# Patient Record
Sex: FEMALE | Race: WHITE | NOT HISPANIC OR LATINO | Employment: OTHER | URBAN - METROPOLITAN AREA
[De-identification: names, ages, dates, MRNs, and addresses within clinical notes are randomized per-mention and may not be internally consistent; named-entity substitution may affect disease eponyms.]

---

## 2023-04-25 ENCOUNTER — HOSPITAL ENCOUNTER (INPATIENT)
Facility: HOSPITAL | Age: 68
LOS: 2 days | Discharge: HOME | DRG: 281 | End: 2023-04-27
Attending: STUDENT IN AN ORGANIZED HEALTH CARE EDUCATION/TRAINING PROGRAM | Admitting: INTERNAL MEDICINE
Payer: MEDICARE

## 2023-04-25 DIAGNOSIS — R07.9 CHEST PAIN: Primary | ICD-10-CM

## 2023-04-25 DIAGNOSIS — R11.0 NAUSEA: ICD-10-CM

## 2023-04-25 DIAGNOSIS — I21.3 ST ELEVATION MYOCARDIAL INFARCTION (STEMI), UNSPECIFIED ARTERY: ICD-10-CM

## 2023-04-25 DIAGNOSIS — R07.9 CHEST PAIN, UNSPECIFIED TYPE: ICD-10-CM

## 2023-04-25 DIAGNOSIS — I21.3 STEMI (ST ELEVATION MYOCARDIAL INFARCTION): ICD-10-CM

## 2023-04-25 LAB
ALBUMIN SERPL-MCNC: 4.2 G/DL (ref 3.4–4.8)
ALBUMIN/GLOB SERPL: 1.3 RATIO (ref 1.1–2)
ALP SERPL-CCNC: 83 UNIT/L (ref 40–150)
ALT SERPL-CCNC: 9 UNIT/L (ref 0–55)
APTT PPP: >200 SECONDS (ref 23.2–33.7)
AST SERPL-CCNC: 17 UNIT/L (ref 5–34)
AV INDEX (PROSTH): 0.67
AV MEAN GRADIENT: 2 MMHG
AV PEAK GRADIENT: 4 MMHG
AV VALVE AREA: 2.09 CM2
AV VELOCITY RATIO: 0.76
BASOPHILS # BLD AUTO: 0.03 X10(3)/MCL (ref 0–0.2)
BASOPHILS # BLD AUTO: 0.04 X10(3)/MCL (ref 0–0.2)
BASOPHILS NFR BLD AUTO: 0.2 %
BASOPHILS NFR BLD AUTO: 0.3 %
BILIRUBIN DIRECT+TOT PNL SERPL-MCNC: 0.8 MG/DL
BNP BLD-MCNC: 66 PG/ML
BSA FOR ECHO PROCEDURE: 1.94 M2
BUN SERPL-MCNC: 13.4 MG/DL (ref 9.8–20.1)
CALCIUM SERPL-MCNC: 9.8 MG/DL (ref 8.4–10.2)
CHLORIDE SERPL-SCNC: 100 MMOL/L (ref 98–107)
CHOLEST SERPL-MCNC: 203 MG/DL
CHOLEST/HDLC SERPL: 3 {RATIO} (ref 0–5)
CO2 SERPL-SCNC: 20 MMOL/L (ref 23–31)
CREAT SERPL-MCNC: 1.07 MG/DL (ref 0.55–1.02)
CV ECHO LV RWT: 0.34 CM
DOP CALC AO PEAK VEL: 0.96 M/S
DOP CALC AO VTI: 21.9 CM
DOP CALC LVOT AREA: 3.1 CM2
DOP CALC LVOT DIAMETER: 2 CM
DOP CALC LVOT PEAK VEL: 0.73 M/S
DOP CALC LVOT STROKE VOLUME: 45.84 CM3
DOP CALC MV VTI: 14.7 CM
DOP CALCLVOT PEAK VEL VTI: 14.6 CM
E WAVE DECELERATION TIME: 137 MSEC
E/A RATIO: 1.76
E/E' RATIO: 6.44 M/S
ECHO LV POSTERIOR WALL: 0.88 CM (ref 0.6–1.1)
EJECTION FRACTION: 35 %
EOSINOPHIL # BLD AUTO: 0.01 X10(3)/MCL (ref 0–0.9)
EOSINOPHIL # BLD AUTO: 0.03 X10(3)/MCL (ref 0–0.9)
EOSINOPHIL NFR BLD AUTO: 0.1 %
EOSINOPHIL NFR BLD AUTO: 0.2 %
ERYTHROCYTE [DISTWIDTH] IN BLOOD BY AUTOMATED COUNT: 14.4 % (ref 11.5–17)
ERYTHROCYTE [DISTWIDTH] IN BLOOD BY AUTOMATED COUNT: 14.5 % (ref 11.5–17)
EST. AVERAGE GLUCOSE BLD GHB EST-MCNC: 122.6 MG/DL
FRACTIONAL SHORTENING: 24 % (ref 28–44)
GFR SERPLBLD CREATININE-BSD FMLA CKD-EPI: 58 MLS/MIN/1.73/M2
GLOBULIN SER-MCNC: 3.3 GM/DL (ref 2.4–3.5)
GLUCOSE SERPL-MCNC: 192 MG/DL (ref 82–115)
HBA1C MFR BLD: 5.9 %
HCT VFR BLD AUTO: 38 % (ref 37–47)
HCT VFR BLD AUTO: 40.5 % (ref 37–47)
HDLC SERPL-MCNC: 62 MG/DL (ref 35–60)
HGB BLD-MCNC: 13.3 G/DL (ref 12–16)
HGB BLD-MCNC: 13.9 G/DL (ref 12–16)
IMM GRANULOCYTES # BLD AUTO: 0.05 X10(3)/MCL (ref 0–0.04)
IMM GRANULOCYTES # BLD AUTO: 0.08 X10(3)/MCL (ref 0–0.04)
IMM GRANULOCYTES NFR BLD AUTO: 0.4 %
IMM GRANULOCYTES NFR BLD AUTO: 0.5 %
INR BLD: 1.2 (ref 0–1.3)
INTERVENTRICULAR SEPTUM: 0.82 CM (ref 0.6–1.1)
LDLC SERPL CALC-MCNC: 111 MG/DL (ref 50–140)
LEFT ATRIUM SIZE: 4.1 CM
LEFT INTERNAL DIMENSION IN SYSTOLE: 3.95 CM (ref 2.1–4)
LEFT VENTRICLE DIASTOLIC VOLUME INDEX: 67.36 ML/M2
LEFT VENTRICLE DIASTOLIC VOLUME: 130 ML
LEFT VENTRICLE MASS INDEX: 81 G/M2
LEFT VENTRICLE SYSTOLIC VOLUME INDEX: 35.2 ML/M2
LEFT VENTRICLE SYSTOLIC VOLUME: 67.9 ML
LEFT VENTRICULAR INTERNAL DIMENSION IN DIASTOLE: 5.2 CM (ref 3.5–6)
LEFT VENTRICULAR MASS: 156.93 G
LV LATERAL E/E' RATIO: 5.8 M/S
LV SEPTAL E/E' RATIO: 7.25 M/S
LVOT MG: 1 MMHG
LVOT MV: 0.47 CM/S
LYMPHOCYTES # BLD AUTO: 1.15 X10(3)/MCL (ref 0.6–4.6)
LYMPHOCYTES # BLD AUTO: 2.69 X10(3)/MCL (ref 0.6–4.6)
LYMPHOCYTES NFR BLD AUTO: 20 %
LYMPHOCYTES NFR BLD AUTO: 7.4 %
MCH RBC QN AUTO: 35.2 PG (ref 27–31)
MCH RBC QN AUTO: 35.6 PG (ref 27–31)
MCHC RBC AUTO-ENTMCNC: 34.3 G/DL (ref 33–36)
MCHC RBC AUTO-ENTMCNC: 35 G/DL (ref 33–36)
MCV RBC AUTO: 101.6 FL (ref 80–94)
MCV RBC AUTO: 102.5 FL (ref 80–94)
MONOCYTES # BLD AUTO: 0.75 X10(3)/MCL (ref 0.1–1.3)
MONOCYTES # BLD AUTO: 0.9 X10(3)/MCL (ref 0.1–1.3)
MONOCYTES NFR BLD AUTO: 5.6 %
MONOCYTES NFR BLD AUTO: 5.8 %
MV MEAN GRADIENT: 1 MMHG
MV PEAK A VEL: 0.33 M/S
MV PEAK E VEL: 0.58 M/S
MV PEAK GRADIENT: 1 MMHG
MV STENOSIS PRESSURE HALF TIME: 82 MS
MV VALVE AREA BY CONTINUITY EQUATION: 3.12 CM2
MV VALVE AREA P 1/2 METHOD: 2.68 CM2
NEUTROPHILS # BLD AUTO: 13.45 X10(3)/MCL (ref 2.1–9.2)
NEUTROPHILS # BLD AUTO: 9.9 X10(3)/MCL (ref 2.1–9.2)
NEUTROPHILS NFR BLD AUTO: 73.5 %
NEUTROPHILS NFR BLD AUTO: 86 %
NRBC BLD AUTO-RTO: 0 %
NRBC BLD AUTO-RTO: 0 %
OHS LV EJECTION FRACTION SIMPSONS BIPLANE MOD: 3 %
PISA TR MAX VEL: 3.16 M/S
PLATELET # BLD AUTO: 216 X10(3)/MCL (ref 130–400)
PLATELET # BLD AUTO: 289 X10(3)/MCL (ref 130–400)
PMV BLD AUTO: 11.8 FL (ref 7.4–10.4)
PMV BLD AUTO: 12 FL (ref 7.4–10.4)
POTASSIUM SERPL-SCNC: 3.8 MMOL/L (ref 3.5–5.1)
PROT SERPL-MCNC: 7.5 GM/DL (ref 5.8–7.6)
PROTHROMBIN TIME: 15.1 SECONDS (ref 12.5–14.5)
PV PEAK VELOCITY: 0.75 CM/S
RA PRESSURE: 8 MMHG
RBC # BLD AUTO: 3.74 X10(6)/MCL (ref 4.2–5.4)
RBC # BLD AUTO: 3.95 X10(6)/MCL (ref 4.2–5.4)
SODIUM SERPL-SCNC: 138 MMOL/L (ref 136–145)
TDI LATERAL: 0.1 M/S
TDI SEPTAL: 0.08 M/S
TDI: 0.09 M/S
TR MAX PG: 40 MMHG
TRICUSPID ANNULAR PLANE SYSTOLIC EXCURSION: 2.2 CM
TRIGL SERPL-MCNC: 148 MG/DL (ref 37–140)
TROPONIN I SERPL-MCNC: <0.01 NG/ML (ref 0–0.04)
TV REST PULMONARY ARTERY PRESSURE: 48 MMHG
VLDLC SERPL CALC-MCNC: 30 MG/DL
WBC # SPEC AUTO: 13.5 X10(3)/MCL (ref 4.5–11.5)
WBC # SPEC AUTO: 15.6 X10(3)/MCL (ref 4.5–11.5)

## 2023-04-25 PROCEDURE — 25000003 PHARM REV CODE 250: Performed by: INTERNAL MEDICINE

## 2023-04-25 PROCEDURE — 96374 THER/PROPH/DIAG INJ IV PUSH: CPT

## 2023-04-25 PROCEDURE — 25000003 PHARM REV CODE 250: Performed by: PHYSICIAN ASSISTANT

## 2023-04-25 PROCEDURE — 80053 COMPREHEN METABOLIC PANEL: CPT | Performed by: PHYSICIAN ASSISTANT

## 2023-04-25 PROCEDURE — 84484 ASSAY OF TROPONIN QUANT: CPT | Performed by: PHYSICIAN ASSISTANT

## 2023-04-25 PROCEDURE — 85610 PROTHROMBIN TIME: CPT | Performed by: STUDENT IN AN ORGANIZED HEALTH CARE EDUCATION/TRAINING PROGRAM

## 2023-04-25 PROCEDURE — 63600175 PHARM REV CODE 636 W HCPCS

## 2023-04-25 PROCEDURE — 63600175 PHARM REV CODE 636 W HCPCS: Performed by: STUDENT IN AN ORGANIZED HEALTH CARE EDUCATION/TRAINING PROGRAM

## 2023-04-25 PROCEDURE — 99152 MOD SED SAME PHYS/QHP 5/>YRS: CPT | Performed by: INTERNAL MEDICINE

## 2023-04-25 PROCEDURE — 63600175 PHARM REV CODE 636 W HCPCS: Performed by: INTERNAL MEDICINE

## 2023-04-25 PROCEDURE — 80061 LIPID PANEL: CPT | Performed by: NURSE PRACTITIONER

## 2023-04-25 PROCEDURE — C1769 GUIDE WIRE: HCPCS | Performed by: INTERNAL MEDICINE

## 2023-04-25 PROCEDURE — C1894 INTRO/SHEATH, NON-LASER: HCPCS | Performed by: INTERNAL MEDICINE

## 2023-04-25 PROCEDURE — 27201423 OPTIME MED/SURG SUP & DEVICES STERILE SUPPLY: Performed by: INTERNAL MEDICINE

## 2023-04-25 PROCEDURE — 25500020 PHARM REV CODE 255: Performed by: INTERNAL MEDICINE

## 2023-04-25 PROCEDURE — 99285 EMERGENCY DEPT VISIT HI MDM: CPT | Mod: 25

## 2023-04-25 PROCEDURE — 93010 ELECTROCARDIOGRAM REPORT: CPT | Mod: 76,,, | Performed by: INTERNAL MEDICINE

## 2023-04-25 PROCEDURE — 96375 TX/PRO/DX INJ NEW DRUG ADDON: CPT

## 2023-04-25 PROCEDURE — 85025 COMPLETE CBC W/AUTO DIFF WBC: CPT | Performed by: STUDENT IN AN ORGANIZED HEALTH CARE EDUCATION/TRAINING PROGRAM

## 2023-04-25 PROCEDURE — C1887 CATHETER, GUIDING: HCPCS | Performed by: INTERNAL MEDICINE

## 2023-04-25 PROCEDURE — 93458 L HRT ARTERY/VENTRICLE ANGIO: CPT | Performed by: INTERNAL MEDICINE

## 2023-04-25 PROCEDURE — 11000001 HC ACUTE MED/SURG PRIVATE ROOM

## 2023-04-25 PROCEDURE — 83036 HEMOGLOBIN GLYCOSYLATED A1C: CPT | Performed by: NURSE PRACTITIONER

## 2023-04-25 PROCEDURE — 85025 COMPLETE CBC W/AUTO DIFF WBC: CPT | Performed by: PHYSICIAN ASSISTANT

## 2023-04-25 PROCEDURE — 93010 EKG 12-LEAD: ICD-10-PCS | Mod: ,,, | Performed by: INTERNAL MEDICINE

## 2023-04-25 PROCEDURE — 83880 ASSAY OF NATRIURETIC PEPTIDE: CPT | Performed by: PHYSICIAN ASSISTANT

## 2023-04-25 PROCEDURE — 93005 ELECTROCARDIOGRAM TRACING: CPT

## 2023-04-25 PROCEDURE — 93010 ELECTROCARDIOGRAM REPORT: CPT | Mod: ,,, | Performed by: INTERNAL MEDICINE

## 2023-04-25 PROCEDURE — 21400001 HC TELEMETRY ROOM

## 2023-04-25 PROCEDURE — 85730 THROMBOPLASTIN TIME PARTIAL: CPT | Performed by: STUDENT IN AN ORGANIZED HEALTH CARE EDUCATION/TRAINING PROGRAM

## 2023-04-25 PROCEDURE — 25000003 PHARM REV CODE 250: Performed by: NURSE PRACTITIONER

## 2023-04-25 RX ORDER — PANTOPRAZOLE SODIUM 40 MG/1
40 TABLET, DELAYED RELEASE ORAL DAILY
Status: DISCONTINUED | OUTPATIENT
Start: 2023-04-25 | End: 2023-04-26

## 2023-04-25 RX ORDER — SODIUM CHLORIDE 9 MG/ML
100 INJECTION, SOLUTION INTRAVENOUS CONTINUOUS
Status: DISCONTINUED | OUTPATIENT
Start: 2023-04-25 | End: 2023-04-25

## 2023-04-25 RX ORDER — ASPIRIN 325 MG
325 TABLET, DELAYED RELEASE (ENTERIC COATED) ORAL
Status: COMPLETED | OUTPATIENT
Start: 2023-04-25 | End: 2023-04-25

## 2023-04-25 RX ORDER — LIDOCAINE HYDROCHLORIDE 10 MG/ML
INJECTION INFILTRATION; PERINEURAL
Status: DISCONTINUED | OUTPATIENT
Start: 2023-04-25 | End: 2023-04-25 | Stop reason: HOSPADM

## 2023-04-25 RX ORDER — ONDANSETRON HYDROCHLORIDE 4 MG/5ML
8 SOLUTION ORAL EVERY 6 HOURS PRN
Status: DISCONTINUED | OUTPATIENT
Start: 2023-04-25 | End: 2023-04-27 | Stop reason: HOSPADM

## 2023-04-25 RX ORDER — ONDANSETRON 2 MG/ML
4 INJECTION INTRAMUSCULAR; INTRAVENOUS
Status: COMPLETED | OUTPATIENT
Start: 2023-04-25 | End: 2023-04-25

## 2023-04-25 RX ORDER — LISINOPRIL AND HYDROCHLOROTHIAZIDE 12.5; 2 MG/1; MG/1
1 TABLET ORAL DAILY
Status: ON HOLD | COMMUNITY
End: 2023-04-27 | Stop reason: HOSPADM

## 2023-04-25 RX ORDER — ONDANSETRON 2 MG/ML
INJECTION INTRAMUSCULAR; INTRAVENOUS
Status: COMPLETED
Start: 2023-04-25 | End: 2023-04-25

## 2023-04-25 RX ORDER — NITROGLYCERIN 20 MG/100ML
INJECTION INTRAVENOUS
Status: DISCONTINUED | OUTPATIENT
Start: 2023-04-25 | End: 2023-04-25 | Stop reason: HOSPADM

## 2023-04-25 RX ORDER — ASPIRIN 81 MG/1
81 TABLET ORAL DAILY
Status: DISCONTINUED | OUTPATIENT
Start: 2023-04-26 | End: 2023-04-27 | Stop reason: HOSPADM

## 2023-04-25 RX ORDER — MELOXICAM 15 MG/1
15 TABLET ORAL DAILY
Status: ON HOLD | COMMUNITY
End: 2023-04-27 | Stop reason: HOSPADM

## 2023-04-25 RX ORDER — MIDAZOLAM HYDROCHLORIDE 1 MG/ML
INJECTION INTRAMUSCULAR; INTRAVENOUS
Status: DISCONTINUED | OUTPATIENT
Start: 2023-04-25 | End: 2023-04-25 | Stop reason: HOSPADM

## 2023-04-25 RX ORDER — LIDOCAINE HYDROCHLORIDE 20 MG/ML
15 SOLUTION OROPHARYNGEAL ONCE
Status: COMPLETED | OUTPATIENT
Start: 2023-04-25 | End: 2023-04-25

## 2023-04-25 RX ORDER — FLUOXETINE HYDROCHLORIDE 20 MG/1
20 CAPSULE ORAL DAILY
COMMUNITY

## 2023-04-25 RX ORDER — MORPHINE SULFATE 4 MG/ML
4 INJECTION, SOLUTION INTRAMUSCULAR; INTRAVENOUS
Status: COMPLETED | OUTPATIENT
Start: 2023-04-25 | End: 2023-04-25

## 2023-04-25 RX ORDER — ONDANSETRON 2 MG/ML
4 INJECTION INTRAMUSCULAR; INTRAVENOUS EVERY 6 HOURS PRN
Status: DISCONTINUED | OUTPATIENT
Start: 2023-04-25 | End: 2023-04-27 | Stop reason: HOSPADM

## 2023-04-25 RX ORDER — HEPARIN SODIUM 5000 [USP'U]/ML
INJECTION, SOLUTION INTRAVENOUS; SUBCUTANEOUS
Status: DISPENSED
Start: 2023-04-25 | End: 2023-04-25

## 2023-04-25 RX ORDER — HEPARIN SODIUM 1000 [USP'U]/ML
INJECTION, SOLUTION INTRAVENOUS; SUBCUTANEOUS
Status: DISCONTINUED | OUTPATIENT
Start: 2023-04-25 | End: 2023-04-25 | Stop reason: HOSPADM

## 2023-04-25 RX ORDER — MORPHINE SULFATE 4 MG/ML
INJECTION, SOLUTION INTRAMUSCULAR; INTRAVENOUS
Status: COMPLETED
Start: 2023-04-25 | End: 2023-04-25

## 2023-04-25 RX ORDER — MORPHINE SULFATE 4 MG/ML
2 INJECTION, SOLUTION INTRAMUSCULAR; INTRAVENOUS EVERY 4 HOURS PRN
Status: DISCONTINUED | OUTPATIENT
Start: 2023-04-25 | End: 2023-04-27 | Stop reason: HOSPADM

## 2023-04-25 RX ORDER — HYDRALAZINE HYDROCHLORIDE 20 MG/ML
10 INJECTION INTRAMUSCULAR; INTRAVENOUS EVERY 4 HOURS PRN
Status: DISCONTINUED | OUTPATIENT
Start: 2023-04-25 | End: 2023-04-27 | Stop reason: HOSPADM

## 2023-04-25 RX ORDER — VERAPAMIL HYDROCHLORIDE 2.5 MG/ML
INJECTION, SOLUTION INTRAVENOUS
Status: DISCONTINUED | OUTPATIENT
Start: 2023-04-25 | End: 2023-04-25 | Stop reason: HOSPADM

## 2023-04-25 RX ORDER — ONDANSETRON 4 MG/1
8 TABLET, ORALLY DISINTEGRATING ORAL EVERY 8 HOURS PRN
Status: DISCONTINUED | OUTPATIENT
Start: 2023-04-25 | End: 2023-04-25

## 2023-04-25 RX ORDER — MAG HYDROX/ALUMINUM HYD/SIMETH 200-200-20
30 SUSPENSION, ORAL (FINAL DOSE FORM) ORAL ONCE
Status: COMPLETED | OUTPATIENT
Start: 2023-04-25 | End: 2023-04-25

## 2023-04-25 RX ORDER — HEPARIN SODIUM,PORCINE/D5W 25000/250
0-40 INTRAVENOUS SOLUTION INTRAVENOUS CONTINUOUS
Status: DISCONTINUED | OUTPATIENT
Start: 2023-04-25 | End: 2023-04-26

## 2023-04-25 RX ORDER — HYDROCODONE BITARTRATE AND ACETAMINOPHEN 5; 325 MG/1; MG/1
1 TABLET ORAL EVERY 4 HOURS PRN
Status: DISCONTINUED | OUTPATIENT
Start: 2023-04-25 | End: 2023-04-27 | Stop reason: HOSPADM

## 2023-04-25 RX ORDER — ACETAMINOPHEN 325 MG/1
650 TABLET ORAL EVERY 4 HOURS PRN
Status: DISCONTINUED | OUTPATIENT
Start: 2023-04-25 | End: 2023-04-27 | Stop reason: HOSPADM

## 2023-04-25 RX ORDER — ATORVASTATIN CALCIUM 40 MG/1
80 TABLET, FILM COATED ORAL DAILY
Status: DISCONTINUED | OUTPATIENT
Start: 2023-04-25 | End: 2023-04-27 | Stop reason: HOSPADM

## 2023-04-25 RX ORDER — FENTANYL CITRATE 50 UG/ML
INJECTION, SOLUTION INTRAMUSCULAR; INTRAVENOUS
Status: DISCONTINUED | OUTPATIENT
Start: 2023-04-25 | End: 2023-04-25 | Stop reason: HOSPADM

## 2023-04-25 RX ADMIN — LIDOCAINE HYDROCHLORIDE 15 ML: 20 SOLUTION ORAL; TOPICAL at 05:04

## 2023-04-25 RX ADMIN — SODIUM CHLORIDE 100 ML: 9 INJECTION, SOLUTION INTRAVENOUS at 01:04

## 2023-04-25 RX ADMIN — ATORVASTATIN CALCIUM 80 MG: 40 TABLET, FILM COATED ORAL at 04:04

## 2023-04-25 RX ADMIN — MORPHINE SULFATE 2 MG: 4 INJECTION INTRAVENOUS at 07:04

## 2023-04-25 RX ADMIN — HEPARIN SODIUM 12 UNITS/KG/HR: 10000 INJECTION, SOLUTION INTRAVENOUS at 10:04

## 2023-04-25 RX ADMIN — PANTOPRAZOLE SODIUM 40 MG: 40 TABLET, DELAYED RELEASE ORAL at 05:04

## 2023-04-25 RX ADMIN — ONDANSETRON 4 MG: 2 INJECTION INTRAMUSCULAR; INTRAVENOUS at 10:04

## 2023-04-25 RX ADMIN — MORPHINE SULFATE 4 MG: 4 INJECTION INTRAVENOUS at 10:04

## 2023-04-25 RX ADMIN — ALUMINUM HYDROXIDE, MAGNESIUM HYDROXIDE, AND SIMETHICONE 30 ML: 200; 200; 20 SUSPENSION ORAL at 05:04

## 2023-04-25 RX ADMIN — ONDANSETRON 4 MG: 2 INJECTION INTRAMUSCULAR; INTRAVENOUS at 07:04

## 2023-04-25 RX ADMIN — ASPIRIN 325 MG: 325 TABLET, COATED ORAL at 10:04

## 2023-04-25 RX ADMIN — MORPHINE SULFATE 4 MG: 4 INJECTION, SOLUTION INTRAMUSCULAR; INTRAVENOUS at 10:04

## 2023-04-25 RX ADMIN — ONDANSETRON 8 MG: 4 TABLET, ORALLY DISINTEGRATING ORAL at 01:04

## 2023-04-25 NOTE — NURSING
Nurses Note -- 4 Eyes      4/25/2023   1:20 PM      Skin assessed during: Admit      [x] No Altered Skin Integrity Present    []Prevention Measures Documented      [] Yes- Altered Skin Integrity Present or Discovered   [] LDA Added if Not in Epic (Describe Wound)   [] New Altered Skin Integrity was Present on Admit and Documented in LDA   [] Wound Image Taken    Wound Care Consulted? No    Attending Nurse:  Lesvia Gama LPN     Second RN/Staff Member:  Stephanie Wagner

## 2023-04-25 NOTE — H&P
[facility] - Emergency Dept  Cardiology  History and Physical     Patient Name: Maribel Gonsalez  MRN: [id number]  Admission Date: 2023  Code Status: No Order   Attending Provider: No att. providers found   Primary Care Physician: No primary care provider on file.  Principal Problem:<principal problem not specified>    Patient information was obtained from patient, relative(s), and ER records.     Subjective:     Chief Complaint:  chest pain    HPI:   Ms. Gonsalez is a 66 y/o female, unknown to [facility], who presented to ED with c/o midsternal chest tightness radiating to back and left arm with associated SOB and nausea starting at 0830. Denies vomiting or diaphoresis. EKG revealed ST elevations in inferior leads. She will be admitted to [facility] for STEMI      PMH: HTN  PSH: none  Family History: mother- HTN, kidney disease; dad-  cancer  Social History: + smoker    Previous Cardiac Diagnostics:   None on file      No past medical history on file.      Review of Systems   Constitutional: Negative.   Cardiovascular:  Positive for chest pain.   Respiratory:  Negative for shortness of breath.    Skin: Negative.    Musculoskeletal: Negative.    Gastrointestinal:  Positive for nausea.   Genitourinary: Negative.    Neurological: Negative.    Psychiatric/Behavioral:  The patient is nervous/anxious.    Objective:     Vital Signs (Most Recent):  Pulse: (!) 59 (23 0945)  Resp: 18 (23 0945)  BP: 122/76 (23 0945)  SpO2: 100 % (23 0945)   Vital Signs (24h Range):  Pulse:  [59] 59  Resp:  [18] 18  SpO2:  [100 %] 100 %  BP: (122)/(76) 122/76     Weight: 77.1 kg (170 lb)  Body mass index is 25.1 kg/m².    SpO2: 100 %       No intake or output data in the 24 hours ending 23 1029    Lines/Drains/Airways       Peripheral Intravenous Line  Duration                  Peripheral IV - Single Lumen 23 1013 20 G Right Antecubital <1 day         Peripheral IV - Single Lumen 23 1022 18 G  Left Antecubital <1 day                    Physical Exam  HENT:      Mouth/Throat:      Mouth: Mucous membranes are moist.   Cardiovascular:      Rate and Rhythm: Normal rate and regular rhythm.      Pulses: Normal pulses.      Heart sounds: Normal heart sounds.   Pulmonary:      Effort: Pulmonary effort is normal.      Breath sounds: Normal breath sounds.   Abdominal:      Palpations: Abdomen is soft.   Musculoskeletal:         General: Normal range of motion.   Skin:     General: Skin is warm.   Neurological:      General: No focal deficit present.      Mental Status: She is alert.       EKG:   Not scanned into system      Significant Labs: BMP: No results for input(s): GLU, NA, K, CL, CO2, BUN, CREATININE, CALCIUM, MG in the last 48 hours., CBC No results for input(s): WBC, HGB, HCT, PLT in the last 48 hours., and Troponin No results for input(s): TROPONINI in the last 48 hours.    Significant Imaging:   Assessment and Plan:   STEMI, inferior  HTN  Nausea  Tobacco dependence  No hx GIB    Plan:  Will take to cath lab for emergent LHC. R/B/A discussed with patient and she is amenable to proceeding. Consent obtained and placed on chart.   No BB due to bradycardia.   Obtain Echo  Start aspirin 81mg daily and atorvastatin 80 mg daily  Obtain lipid panel and A1C        VTE Risk Mitigation (From admission, onward)           Ordered     heparin 25,000 units in dextrose 5% (100 units/ml) IV bolus from bag - ADDITIONAL PRN BOLUS - 60 units/kg (max bolus 4000 units)  As needed (PRN)        Question:  Heparin Infusion Adjustment (DO NOT MODIFY ANSWER)  Answer:  [email or web address]\epic\Images\Pharmacy\HeparinInfusions\heparin LOW INTENSITY nomogram for [facility] [id number].pdf    04/25/23 1026     heparin 25,000 units in dextrose 5% (100 units/ml) IV bolus from bag - ADDITIONAL PRN BOLUS - 30 units/kg (max bolus 4000 units)  As needed (PRN)        Question:  Heparin Infusion Adjustment (DO NOT MODIFY ANSWER)  Answer:   \\[email or web address]\epic\Images\Pharmacy\HeparinInfusions\heparin LOW INTENSITY nomogram for [facility] [id number].pdf    04/25/23 1026     heparin 25,000 units in dextrose 5% (100 units/ml) IV bolus from bag INITIAL BOLUS (max bolus 4000 units)  Once        Question:  Heparin Infusion Adjustment (DO NOT MODIFY ANSWER)  Answer:  \\[email or web address]\epic\Images\Pharmacy\HeparinInfusions\heparin LOW INTENSITY nomogram for [facility] [id number].pdf    04/25/23 1026     heparin 25,000 units in dextrose 5% 250 mL (100 units/mL) infusion LOW INTENSITY nomogram - [facility]  Continuous        Question Answer Comment   Begin at (in units/kg/hr) 12    Heparin Infusion Adjustment (DO NOT MODIFY ANSWER) \\[email or web address]\epic\Images\Pharmacy\HeparinInfusions\heparin LOW INTENSITY nomogram for [facility] [id number].pdf        04/25/23 1026                    COY Dorman  Cardiology  [facility] - Emergency Dept  04/25/2023 10:29 AM

## 2023-04-25 NOTE — Clinical Note
The closure device was deployed in the right radial artery. 11 cc's of air were inserted into the closure device.

## 2023-04-25 NOTE — FIRST PROVIDER EVALUATION
"Medical screening examination initiated.  I have conducted a focused provider triage encounter, findings are as follows:    Brief history of present illness:  66 yo female presents with chest pain since 0830 this morning. Denies any cardiac history.     Vitals:    04/25/23 0945   BP: 122/76   Pulse: (!) 59   Resp: 18   SpO2: 100%   Weight: 77.1 kg (170 lb)   Height: 5' 9" (1.753 m)       Pertinent physical exam:  Patient awake and moaning in wheelchair.     Brief workup plan:  labs, EKG, CXR, aspirin, IV    Preliminary workup initiated; this workup will be continued and followed by the physician or advanced practice provider that is assigned to the patient when roomed.  "

## 2023-04-26 ENCOUNTER — ANESTHESIA (OUTPATIENT)
Dept: ENDOSCOPY | Facility: HOSPITAL | Age: 68
DRG: 281 | End: 2023-04-26
Payer: MEDICARE

## 2023-04-26 ENCOUNTER — ANESTHESIA EVENT (OUTPATIENT)
Dept: ENDOSCOPY | Facility: HOSPITAL | Age: 68
DRG: 281 | End: 2023-04-26
Payer: MEDICARE

## 2023-04-26 LAB
APTT PPP: 26 SECONDS (ref 23.2–33.7)
BASOPHILS # BLD AUTO: 0.02 X10(3)/MCL (ref 0–0.2)
BASOPHILS NFR BLD AUTO: 0.1 %
EOSINOPHIL # BLD AUTO: 0.01 X10(3)/MCL (ref 0–0.9)
EOSINOPHIL NFR BLD AUTO: 0.1 %
ERYTHROCYTE [DISTWIDTH] IN BLOOD BY AUTOMATED COUNT: 14.6 % (ref 11.5–17)
HCT VFR BLD AUTO: 37.1 % (ref 37–47)
HGB BLD-MCNC: 12.7 G/DL (ref 12–16)
IMM GRANULOCYTES # BLD AUTO: 0.05 X10(3)/MCL (ref 0–0.04)
IMM GRANULOCYTES NFR BLD AUTO: 0.4 %
LYMPHOCYTES # BLD AUTO: 2.31 X10(3)/MCL (ref 0.6–4.6)
LYMPHOCYTES NFR BLD AUTO: 16.7 %
MCH RBC QN AUTO: 35.5 PG (ref 27–31)
MCHC RBC AUTO-ENTMCNC: 34.2 G/DL (ref 33–36)
MCV RBC AUTO: 103.6 FL (ref 80–94)
MONOCYTES # BLD AUTO: 1.28 X10(3)/MCL (ref 0.1–1.3)
MONOCYTES NFR BLD AUTO: 9.2 %
NEUTROPHILS # BLD AUTO: 10.19 X10(3)/MCL (ref 2.1–9.2)
NEUTROPHILS NFR BLD AUTO: 73.5 %
NRBC BLD AUTO-RTO: 0 %
PLATELET # BLD AUTO: 213 X10(3)/MCL (ref 130–400)
PMV BLD AUTO: 12.1 FL (ref 7.4–10.4)
POCT GLUCOSE: 106 MG/DL (ref 70–110)
POCT GLUCOSE: 107 MG/DL (ref 70–110)
RBC # BLD AUTO: 3.58 X10(6)/MCL (ref 4.2–5.4)
WBC # SPEC AUTO: 13.9 X10(3)/MCL (ref 4.5–11.5)

## 2023-04-26 PROCEDURE — 88305 TISSUE EXAM BY PATHOLOGIST: CPT | Performed by: STUDENT IN AN ORGANIZED HEALTH CARE EDUCATION/TRAINING PROGRAM

## 2023-04-26 PROCEDURE — 21400001 HC TELEMETRY ROOM

## 2023-04-26 PROCEDURE — D9220A PRA ANESTHESIA: Mod: CRNA,,, | Performed by: NURSE ANESTHETIST, CERTIFIED REGISTERED

## 2023-04-26 PROCEDURE — D9220A PRA ANESTHESIA: Mod: ANES,,, | Performed by: ANESTHESIOLOGY

## 2023-04-26 PROCEDURE — 85730 THROMBOPLASTIN TIME PARTIAL: CPT | Performed by: STUDENT IN AN ORGANIZED HEALTH CARE EDUCATION/TRAINING PROGRAM

## 2023-04-26 PROCEDURE — 63600175 PHARM REV CODE 636 W HCPCS: Performed by: NURSE ANESTHETIST, CERTIFIED REGISTERED

## 2023-04-26 PROCEDURE — 63600175 PHARM REV CODE 636 W HCPCS: Performed by: NURSE PRACTITIONER

## 2023-04-26 PROCEDURE — D9220A PRA ANESTHESIA: ICD-10-PCS | Mod: ANES,,, | Performed by: ANESTHESIOLOGY

## 2023-04-26 PROCEDURE — 25000003 PHARM REV CODE 250

## 2023-04-26 PROCEDURE — 63600175 PHARM REV CODE 636 W HCPCS: Performed by: INTERNAL MEDICINE

## 2023-04-26 PROCEDURE — D9220A PRA ANESTHESIA: ICD-10-PCS | Mod: CRNA,,, | Performed by: NURSE ANESTHETIST, CERTIFIED REGISTERED

## 2023-04-26 PROCEDURE — 37000008 HC ANESTHESIA 1ST 15 MINUTES: Performed by: STUDENT IN AN ORGANIZED HEALTH CARE EDUCATION/TRAINING PROGRAM

## 2023-04-26 PROCEDURE — 25000003 PHARM REV CODE 250: Performed by: NURSE PRACTITIONER

## 2023-04-26 PROCEDURE — 88312 SPECIAL STAINS GROUP 1: CPT

## 2023-04-26 PROCEDURE — 25000003 PHARM REV CODE 250: Performed by: NURSE ANESTHETIST, CERTIFIED REGISTERED

## 2023-04-26 PROCEDURE — 43239 EGD BIOPSY SINGLE/MULTIPLE: CPT | Performed by: STUDENT IN AN ORGANIZED HEALTH CARE EDUCATION/TRAINING PROGRAM

## 2023-04-26 PROCEDURE — 88313 SPECIAL STAINS GROUP 2: CPT

## 2023-04-26 PROCEDURE — 85025 COMPLETE CBC W/AUTO DIFF WBC: CPT | Performed by: STUDENT IN AN ORGANIZED HEALTH CARE EDUCATION/TRAINING PROGRAM

## 2023-04-26 RX ORDER — HYOSCYAMINE SULFATE 0.12 MG/1
0.12 TABLET SUBLINGUAL EVERY 6 HOURS PRN
Status: DISCONTINUED | OUTPATIENT
Start: 2023-04-26 | End: 2023-04-27 | Stop reason: HOSPADM

## 2023-04-26 RX ORDER — PANTOPRAZOLE SODIUM 40 MG/1
40 TABLET, DELAYED RELEASE ORAL
Status: DISCONTINUED | OUTPATIENT
Start: 2023-04-26 | End: 2023-04-27 | Stop reason: HOSPADM

## 2023-04-26 RX ORDER — PROPOFOL 10 MG/ML
VIAL (ML) INTRAVENOUS
Status: DISPENSED
Start: 2023-04-26 | End: 2023-04-27

## 2023-04-26 RX ORDER — PROPOFOL 10 MG/ML
VIAL (ML) INTRAVENOUS
Status: DISCONTINUED | OUTPATIENT
Start: 2023-04-26 | End: 2023-04-26

## 2023-04-26 RX ORDER — LIDOCAINE HYDROCHLORIDE 20 MG/ML
INJECTION, SOLUTION EPIDURAL; INFILTRATION; INTRACAUDAL; PERINEURAL
Status: COMPLETED
Start: 2023-04-26 | End: 2023-04-26

## 2023-04-26 RX ORDER — FUROSEMIDE 10 MG/ML
20 INJECTION INTRAMUSCULAR; INTRAVENOUS ONCE
Status: COMPLETED | OUTPATIENT
Start: 2023-04-26 | End: 2023-04-26

## 2023-04-26 RX ORDER — METOPROLOL SUCCINATE 25 MG/1
25 TABLET, EXTENDED RELEASE ORAL DAILY
Status: DISCONTINUED | OUTPATIENT
Start: 2023-04-26 | End: 2023-04-27

## 2023-04-26 RX ORDER — PROPOFOL 10 MG/ML
VIAL (ML) INTRAVENOUS
Status: COMPLETED
Start: 2023-04-26 | End: 2023-04-26

## 2023-04-26 RX ORDER — LIDOCAINE HYDROCHLORIDE 20 MG/ML
INJECTION, SOLUTION EPIDURAL; INFILTRATION; INTRACAUDAL; PERINEURAL
Status: DISCONTINUED | OUTPATIENT
Start: 2023-04-26 | End: 2023-04-26

## 2023-04-26 RX ORDER — GLYCOPYRROLATE 0.2 MG/ML
INJECTION INTRAMUSCULAR; INTRAVENOUS
Status: DISPENSED
Start: 2023-04-26 | End: 2023-04-27

## 2023-04-26 RX ADMIN — SODIUM CHLORIDE, SODIUM GLUCONATE, SODIUM ACETATE, POTASSIUM CHLORIDE AND MAGNESIUM CHLORIDE: 526; 502; 368; 37; 30 INJECTION, SOLUTION INTRAVENOUS at 03:04

## 2023-04-26 RX ADMIN — FUROSEMIDE 20 MG: 10 INJECTION, SOLUTION INTRAMUSCULAR; INTRAVENOUS at 04:04

## 2023-04-26 RX ADMIN — ONDANSETRON 4 MG: 2 INJECTION INTRAMUSCULAR; INTRAVENOUS at 01:04

## 2023-04-26 RX ADMIN — METOPROLOL SUCCINATE 25 MG: 25 TABLET, EXTENDED RELEASE ORAL at 04:04

## 2023-04-26 RX ADMIN — PANTOPRAZOLE SODIUM 40 MG: 40 TABLET, DELAYED RELEASE ORAL at 04:04

## 2023-04-26 RX ADMIN — PROPOFOL 100 MG: 10 INJECTION, EMULSION INTRAVENOUS at 03:04

## 2023-04-26 RX ADMIN — PROPOFOL 50 MG: 10 INJECTION, EMULSION INTRAVENOUS at 03:04

## 2023-04-26 RX ADMIN — LIDOCAINE HYDROCHLORIDE 5 ML: 20 INJECTION, SOLUTION INTRAVENOUS at 03:04

## 2023-04-26 NOTE — PROGRESS NOTES
[facility] -   Cardiology  Progress Note    Patient Name: Maribel Gonsalez  MRN: [id number]  Admission Date: 2023  Code Status: No Order   Attending Provider: Yoshi Palacios MD   Primary Care Physician: Abundio Celestin MD  Principal Problem:<principal problem not specified>    Patient information was obtained from patient, relative(s), and ER records.     Subjective:     Chief Complaint:  chest pain    HPI:   Ms. Gonsalez is a 66 y/o female, unknown to [facility], who presented to ED with c/o midsternal chest tightness radiating to back and left arm with associated SOB and nausea starting at 0830. Denies vomiting or diaphoresis. EKG revealed ST elevations in inferior leads. She will be admitted to [facility] for STEMI    Hospital Course:  23: Patient s/p LHC revealing nonobstructive CAD. EF down to 35% with LV diastolic dysfunction, Segmental WMA, and mild to moderate VHD. Denies CP today. She did have episodes of nausea yesterday therefore GI was consulted to evaluate for noncardiac causes of chest pain      PMH: HTN  PSH: none  Family History: mother- HTN, kidney disease; dad-  cancer  Social History: + smoker    Previous Cardiac Diagnostics:   C 23:  Left main:  Widely patent.  Bifurcates into circumflex and LAD.  Left anterior descending:  Moderate caliber vessel with a type 3.  Midvessel 30-40% lesion.  Circumflex:  Moderate caliber vessel with proximal 30-40% lesion.  RCA:  Large caliber dominant vessel with luminal  LVEDP:  20-25 mmHg  Aortic valve:  No stenosis    TTE 23:  The left ventricle is normal in size with decreased systolic function. LVEF 35%.  There are segmental left ventricular wall motion abnormalities: basal hypercontractility with apical hypokinesis. DDx: LAD ischemia versus Takotsubo cardiomyopathy  Left ventricular diastolic dysfunction.  Normal right ventricular size with normal right ventricular systolic function.  Mild mitral regurgitation.  Mild to moderate  tricuspid regurgitation.  The estimated PA systolic pressure is 48 mmHg. There is pulmonary hypertension.         Review of Systems   Constitutional: Negative.   Cardiovascular:  Negative for chest pain.   Skin: Negative.    Musculoskeletal: Negative.    Gastrointestinal:  Positive for nausea.   Genitourinary: Negative.    Neurological: Negative.    Psychiatric/Behavioral:  The patient is not nervous/anxious.    Objective:     Vital Signs (Most Recent):  Temp: 98.1 °F (36.7 °C) (04/26/23 1028)  Pulse: 80 (04/26/23 1028)  Resp: (!) 25 (04/26/23 1028)  BP: 113/64 (04/26/23 1028)  SpO2: 97 % (room air) (04/26/23 1028)   Vital Signs (24h Range):  Temp:  [97.7 °F (36.5 °C)-98.4 °F (36.9 °C)] 98.1 °F (36.7 °C)  Pulse:  [64-80] 80  Resp:  [17-25] 25  SpO2:  [94 %-99 %] 97 %  BP: (109-120)/(64-73) 113/64     Weight: 77.1 kg (169 lb 15.6 oz)  Body mass index is 25.1 kg/m².    SpO2: 97 % (room air)       No intake or output data in the 24 hours ending 04/26/23 1038    Lines/Drains/Airways       Peripheral Intravenous Line  Duration                  Peripheral IV - Single Lumen 04/25/23 1013 20 G Right Antecubital 1 day         Peripheral IV - Single Lumen 04/25/23 1022 18 G Left Antecubital 1 day                    Physical Exam  HENT:      Mouth/Throat:      Mouth: Mucous membranes are moist.   Cardiovascular:      Rate and Rhythm: Normal rate and regular rhythm.      Pulses: Normal pulses.      Heart sounds: Normal heart sounds.   Pulmonary:      Effort: Pulmonary effort is normal.      Breath sounds: Normal breath sounds.   Abdominal:      Palpations: Abdomen is soft.   Musculoskeletal:         General: Normal range of motion.   Skin:     General: Skin is warm.   Neurological:      General: No focal deficit present.      Mental Status: She is alert.       Significant Labs: BMP:   Recent Labs   Lab 04/25/23  1002      K 3.8   CO2 20*   BUN 13.4   CREATININE 1.07*   CALCIUM 9.8   , CBC   Recent Labs   Lab  04/25/23  0959 04/25/23  1223 04/26/23  0438   WBC 13.5* 15.6* 13.9*   HGB 13.9 13.3 12.7   HCT 40.5 38.0 37.1    216 213     , and Troponin   Recent Labs   Lab 04/25/23  1002   TROPONINI <0.010    Cholesterol 203  HDL 62     Triglycerides 148    Significant Imaging:   Assessment and Plan:   STEMI, inferior  Native CAD  --[facility] 04.25.23: LAD 30-40%, Lcx 30-40%  NICMO  --EF 35% with diastolic dysfunction  VHD  --mild to moderate TR  --mild MR  HTN  Nausea  Tobacco dependence  Leukocytosis  --no fevers  No hx GIB    Plan:  Will start GDMT- Add Metoprolol succinate 25 mg daily. Will add ACEi,  ARB or ARNI in am if BP allows  Give Lasix 20 mg IVP x 1 due to elevated LVEDP  Continue aspirin 81mg daily and atorvastatin 80 mg daily  GI planning EGD today  Will need lifevest upon DC due to STEMI and EF 35%    VTE Risk Mitigation (From admission, onward)           Ordered     heparin 25,000 units in dextrose 5% (100 units/ml) IV bolus from bag - ADDITIONAL PRN BOLUS - 60 units/kg (max bolus 4000 units)  As needed (PRN)        Question:  Heparin Infusion Adjustment (DO NOT MODIFY ANSWER)  Answer:  [email or web address]\epic\Images\Pharmacy\HeparinInfusions\heparin LOW INTENSITY nomogram for [facility] [id number].pdf    04/25/23 1026     heparin 25,000 units in dextrose 5% (100 units/ml) IV bolus from bag - ADDITIONAL PRN BOLUS - 30 units/kg (max bolus 4000 units)  As needed (PRN)        Question:  Heparin Infusion Adjustment (DO NOT MODIFY ANSWER)  Answer:  [email or web address]\epic\Images\Pharmacy\HeparinInfusions\heparin LOW INTENSITY nomogram for [facility] [id number].pdf    04/25/23 1026     heparin 25,000 units in dextrose 5% 250 mL (100 units/mL) infusion LOW INTENSITY nomogram - [facility]  Continuous        Question Answer Comment   Begin at (in units/kg/hr) 12    Heparin Infusion Adjustment (DO NOT MODIFY ANSWER) \[email or web address]\epic\Images\Pharmacy\HeparinInfusions\heparin LOW INTENSITY nomogram for [facility] [id number].pdf        04/25/23 1026     heparin  (porcine) 5,000 unit/mL injection         04/25/23 1028                    COY Dorman  Cardiology  [facility] - Emergency Dept  04/26/2023 10:29 AM

## 2023-04-26 NOTE — ANESTHESIA PREPROCEDURE EVALUATION
"                                                                                                             04/26/2023  Maribel Gonsalez is a 67 y.o., female presents with Chest pain, Epigastric pain and Nausea.  Diagnosis:        Chest pain, unspecified type [R07.9]       Nausea [R11.0]        The pt. comes to [facility] endoscopy for the noted procedure under GA/TIVA.  Procedure:   EGD (ESOPHAGOGASTRODUODENOSCOPY) (Abdomen)        PMHx:  Anesthesia History  No medical history recorded     Surgical History:    LEFT HEART CATHETERIZATION                Vital signs:  Pre Vitals     Current as of 04/26/23 1021  BP: 110/73 Pulse: 75   Resp:  SpO2: 96   Temp: 36.8 °C (98.3 °F)   Height: 5' 9" (1.753 m) (04/25/23) Weight: 77.1 kg (169 lb 15.6 oz) (04/25/23)   BMI: 25.1 IBW: 66.2 kg (146 lb 0.4 oz)   Last edited 04/26/23 0752 by ELLYN          Lab Data:      Echo:      EKG:            Pre-op Assessment    I have reviewed the Patient Summary Reports.     I have reviewed the Nursing Notes. I have reviewed the NPO Status.   I have reviewed the Medications.     Review of Systems  Anesthesia Hx:  No problems with previous Anesthesia    Social:  Non-Smoker    Hematology/Oncology:  Hematology Normal   Oncology Normal     EENT/Dental:EENT/Dental Normal   Cardiovascular:  Cardiovascular Normal Exercise tolerance: good   Functional Capacity good / => 4 METS    Pulmonary:  Pulmonary Normal    Renal/:  Renal/ Normal     Hepatic/GI:  Hepatic/GI Normal    Musculoskeletal:  Musculoskeletal Normal    Neurological:  Neurology Normal    Endocrine:  Endocrine Normal    Dermatological:  Skin Normal    Psych:  Psychiatric Normal           Physical Exam  General: Alert, Oriented, Well nourished and Cooperative    Airway:  Mallampati: II   Mouth Opening: Normal  TM Distance: Normal  Tongue: Normal  Neck ROM: Normal ROM    Dental:  Intact, Partial Dentures  Edentulous upper  Chest/Lungs:  Clear to auscultation, Normal Respiratory " Rate    Heart:  Rate: Normal  Rhythm: Regular Rhythm        Anesthesia Plan  Type of Anesthesia, risks & benefits discussed:    Anesthesia Type: Gen Natural Airway  Intra-op Monitoring Plan: Standard ASA Monitors  Induction:  IV  Informed Consent: Informed consent signed with the Patient and all parties understand the risks and agree with anesthesia plan.  All questions answered.   ASA Score: 3  Day of Surgery Review of History & Physical: H&P Update referred to the surgeon/provider.    Ready For Surgery From Anesthesia Perspective.     .

## 2023-04-26 NOTE — PLAN OF CARE
Problem: Adult Inpatient Plan of Care  Goal: Plan of Care Review  Outcome: Ongoing, Progressing  Goal: Patient-Specific Goal (Individualized)  Outcome: Ongoing, Progressing  Goal: Absence of Hospital-Acquired Illness or Injury  Outcome: Ongoing, Progressing  Goal: Optimal Comfort and Wellbeing  Outcome: Ongoing, Progressing  Goal: Readiness for Transition of Care  Outcome: Ongoing, Progressing     Problem: Chest Pain  Goal: Resolution of Chest Pain Symptoms  Outcome: Ongoing, Progressing     Problem: Arrhythmia/Dysrhythmia (Cardiac Catheterization)  Goal: Stable Heart Rate and Rhythm  Outcome: Ongoing, Progressing     Problem: Bleeding (Cardiac Catheterization)  Goal: Absence of Bleeding  Outcome: Ongoing, Progressing     Problem: Contrast-Induced Injury Risk (Cardiac Catheterization)  Goal: Absence of Contrast-Induced Injury  Outcome: Ongoing, Progressing     Problem: Embolism (Cardiac Catheterization)  Goal: Absence of Embolism Signs and Symptoms  Outcome: Ongoing, Progressing     Problem: Ongoing Anesthesia/Sedation Effects (Cardiac Catheterization)  Goal: Anesthesia/Sedation Recovery  Outcome: Ongoing, Progressing     Problem: Pain (Cardiac Catheterization)  Goal: Acceptable Pain Control  Outcome: Ongoing, Progressing     Problem: Vascular Access Protection (Cardiac Catheterization)  Goal: Absence of Vascular Access Complication  Outcome: Ongoing, Progressing

## 2023-04-26 NOTE — PLAN OF CARE
Problem: Adult Inpatient Plan of Care  Goal: Plan of Care Review  4/25/2023 2030 by Dali Mooney RN  Outcome: Ongoing, Progressing  4/25/2023 2030 by Dali Mooney RN  Outcome: Ongoing, Progressing  Goal: Patient-Specific Goal (Individualized)  4/25/2023 2030 by Dali Mooney RN  Outcome: Ongoing, Progressing  4/25/2023 2030 by Dali Mooney RN  Outcome: Ongoing, Progressing  Goal: Absence of Hospital-Acquired Illness or Injury  4/25/2023 2030 by Dali Mooney RN  Outcome: Ongoing, Progressing  4/25/2023 2030 by Dali Mooney RN  Outcome: Ongoing, Progressing  Goal: Optimal Comfort and Wellbeing  4/25/2023 2030 by Dali Mooney RN  Outcome: Ongoing, Progressing  4/25/2023 2030 by Dali Mooney RN  Outcome: Ongoing, Progressing  Goal: Readiness for Transition of Care  4/25/2023 2030 by Dali Mooney RN  Outcome: Ongoing, Progressing  4/25/2023 2030 by Dali Mooney RN  Outcome: Ongoing, Progressing     Problem: Chest Pain  Goal: Resolution of Chest Pain Symptoms  Outcome: Ongoing, Progressing     Problem: Arrhythmia/Dysrhythmia (Cardiac Catheterization)  Goal: Stable Heart Rate and Rhythm  Outcome: Ongoing, Progressing     Problem: Bleeding (Cardiac Catheterization)  Goal: Absence of Bleeding  Outcome: Ongoing, Progressing     Problem: Contrast-Induced Injury Risk (Cardiac Catheterization)  Goal: Absence of Contrast-Induced Injury  Outcome: Ongoing, Progressing     Problem: Embolism (Cardiac Catheterization)  Goal: Absence of Embolism Signs and Symptoms  Outcome: Ongoing, Progressing     Problem: Ongoing Anesthesia/Sedation Effects (Cardiac Catheterization)  Goal: Anesthesia/Sedation Recovery  Outcome: Ongoing, Progressing     Problem: Pain (Cardiac Catheterization)  Goal: Acceptable Pain Control  Outcome: Ongoing, Progressing     Problem: Vascular Access Protection (Cardiac Catheterization)  Goal: Absence of Vascular Access Complication  Outcome: Ongoing, Progressing      yes

## 2023-04-26 NOTE — NURSING
Patient return from GI Lab via wheelchair, AAOx4. Resp full and even, lungs CTA anterior lateral lung fields. VS taken WNL, denies any c/o pain. Tolerating liquids without difficulty.

## 2023-04-26 NOTE — CONSULTS
Gastroenterology Consultation Note    Reason for Consult:  noncardiac chest pain    PCP:   Abundio Celestin MD    Referring MD:  Yoshi Palacios MD    Hospital Day: 1     Initial History of Present Illness (HPI):  This is a 67 y.o. female known to Dr. Lyle with PMH of tobacco use, HTN, HLD, anxiety. She presented to the ED 23 with dyspnea and chest pain. EKG revealed ST elevations in inferior leads and STEMI activation was called. Patient taken to cath lab for emergent LHC that was negative. Echo revealed decreased systolic function - EF 35%. Patient c/o nausea overnight. GI consulted for noncardiac chest pain.    Most recent labs include WBC 13.9, H&H 12.7/37.1, INR 1.20    Spoke with nurse regarding patient. Heparin drip stopped. Patient took some of her home meds from her purse earlier this a.m. despite NPO orders.    Patient resting in bed,  at bedside. Patient denies any dysphagia. She was nauseated overnight. Abd soft and nontender.     Colonoscopy 2015 Dr. Lyle for screenin descending colon TA, medium internal hemorrhoids. Recommended repeat in 5 years, this was never performed per chart review.    ROS:  Review of Systems   Constitutional:  Negative for malaise/fatigue.   Respiratory:  Negative for cough and shortness of breath.    Cardiovascular:  Positive for chest pain.   Gastrointestinal:  Positive for nausea. Negative for abdominal pain, constipation, diarrhea and vomiting.     Medical History:   No past medical history on file.    Surgical History:   No past surgical history on file.    Family History:   No family history on file..     Social History:   Social History     Tobacco Use    Smoking status: Not on file    Smokeless tobacco: Not on file   Substance Use Topics    Alcohol use: Not on file       Allergies:  Review of patient's allergies indicates:  No Known Allergies    Medications Prior to Admission   Medication Sig Dispense Refill Last Dose    FLUoxetine 20 MG  "capsule Take 20 mg by mouth once daily.   4/24/2023    lisinopriL-hydrochlorothiazide (PRINZIDE,ZESTORETIC) 20-12.5 mg per tablet Take 1 tablet by mouth once daily.   4/24/2023    meloxicam (MOBIC) 15 MG tablet Take 15 mg by mouth once daily.   4/24/2023         Objective Findings:    Vital Signs:  /73   Pulse 75   Temp 98.3 °F (36.8 °C) (Oral)   Resp (!) 21   Ht 5' 9" (1.753 m)   Wt 77.1 kg (169 lb 15.6 oz)   SpO2 96%   BMI 25.10 kg/m²   Body mass index is 25.1 kg/m².    Physical Exam:  Physical Exam  Constitutional:       General: She is not in acute distress.     Appearance: She is obese. She is not ill-appearing.   HENT:      Head: Normocephalic and atraumatic.      Right Ear: External ear normal.      Left Ear: External ear normal.      Nose: Nose normal.      Mouth/Throat:      Pharynx: Oropharynx is clear.   Eyes:      Conjunctiva/sclera: Conjunctivae normal.   Pulmonary:      Effort: Pulmonary effort is normal. No respiratory distress.   Abdominal:      General: Abdomen is flat. There is no distension.      Palpations: Abdomen is soft.      Tenderness: There is no abdominal tenderness. There is no guarding.   Musculoskeletal:         General: Normal range of motion.      Cervical back: Normal range of motion and neck supple.   Skin:     General: Skin is warm and dry.      Coloration: Skin is not pale.   Neurological:      Mental Status: She is alert and oriented to person, place, and time. Mental status is at baseline.      Motor: No weakness.   Psychiatric:         Mood and Affect: Mood normal.         Behavior: Behavior normal.         Thought Content: Thought content normal.       Labs:  Recent Results (from the past 24 hour(s))   CBC with Differential    Collection Time: 04/25/23  9:59 AM   Result Value Ref Range    WBC 13.5 (H) 4.5 - 11.5 x10(3)/mcL    RBC 3.95 (L) 4.20 - 5.40 x10(6)/mcL    Hgb 13.9 12.0 - 16.0 g/dL    Hct 40.5 37.0 - 47.0 %    .5 (H) 80.0 - 94.0 fL    MCH 35.2 (H) " 27.0 - 31.0 pg    MCHC 34.3 33.0 - 36.0 g/dL    RDW 14.4 11.5 - 17.0 %    Platelet 289 130 - 400 x10(3)/mcL    MPV 12.0 (H) 7.4 - 10.4 fL    Neut % 73.5 %    Lymph % 20.0 %    Mono % 5.6 %    Eos % 0.2 %    Basophil % 0.3 %    Lymph # 2.69 0.6 - 4.6 x10(3)/mcL    Neut # 9.90 (H) 2.1 - 9.2 x10(3)/mcL    Mono # 0.75 0.1 - 1.3 x10(3)/mcL    Eos # 0.03 0 - 0.9 x10(3)/mcL    Baso # 0.04 0 - 0.2 x10(3)/mcL    IG# 0.05 (H) 0 - 0.04 x10(3)/mcL    IG% 0.4 %    NRBC% 0.0 %   Brain natriuretic peptide    Collection Time: 04/25/23 10:01 AM   Result Value Ref Range    Natriuretic Peptide 66.0 <=100.0 pg/mL   Comprehensive metabolic panel    Collection Time: 04/25/23 10:02 AM   Result Value Ref Range    Sodium Level 138 136 - 145 mmol/L    Potassium Level 3.8 3.5 - 5.1 mmol/L    Chloride 100 98 - 107 mmol/L    Carbon Dioxide 20 (L) 23 - 31 mmol/L    Glucose Level 192 (H) 82 - 115 mg/dL    Blood Urea Nitrogen 13.4 9.8 - 20.1 mg/dL    Creatinine 1.07 (H) 0.55 - 1.02 mg/dL    Calcium Level Total 9.8 8.4 - 10.2 mg/dL    Protein Total 7.5 5.8 - 7.6 gm/dL    Albumin Level 4.2 3.4 - 4.8 g/dL    Globulin 3.3 2.4 - 3.5 gm/dL    Albumin/Globulin Ratio 1.3 1.1 - 2.0 ratio    Bilirubin Total 0.8 <=1.5 mg/dL    Alkaline Phosphatase 83 40 - 150 unit/L    Alanine Aminotransferase 9 0 - 55 unit/L    Aspartate Aminotransferase 17 5 - 34 unit/L    eGFR 58 mls/min/1.73/m2   Troponin I    Collection Time: 04/25/23 10:02 AM   Result Value Ref Range    Troponin-I <0.010 0.000 - 0.045 ng/mL   Lipid Panel    Collection Time: 04/25/23 10:02 AM   Result Value Ref Range    Cholesterol Total 203 (H) <=200 mg/dL    HDL Cholesterol 62 (H) 35 - 60 mg/dL    Triglyceride 148 (H) 37 - 140 mg/dL    Cholesterol/HDL Ratio 3 0 - 5    Very Low Density Lipoprotein 30     LDL Cholesterol 111.00 50.00 - 140.00 mg/dL   APTT    Collection Time: 04/25/23 12:23 PM   Result Value Ref Range    PTT >200.0 (HH) 23.2 - 33.7 seconds   Protime-INR    Collection Time: 04/25/23 12:23  PM   Result Value Ref Range    PT 15.1 (H) 12.5 - 14.5 seconds    INR 1.20 0.00 - 1.30   CBC with Differential    Collection Time: 04/25/23 12:23 PM   Result Value Ref Range    WBC 15.6 (H) 4.5 - 11.5 x10(3)/mcL    RBC 3.74 (L) 4.20 - 5.40 x10(6)/mcL    Hgb 13.3 12.0 - 16.0 g/dL    Hct 38.0 37.0 - 47.0 %    .6 (H) 80.0 - 94.0 fL    MCH 35.6 (H) 27.0 - 31.0 pg    MCHC 35.0 33.0 - 36.0 g/dL    RDW 14.5 11.5 - 17.0 %    Platelet 216 130 - 400 x10(3)/mcL    MPV 11.8 (H) 7.4 - 10.4 fL    Neut % 86.0 %    Lymph % 7.4 %    Mono % 5.8 %    Eos % 0.1 %    Basophil % 0.2 %    Lymph # 1.15 0.6 - 4.6 x10(3)/mcL    Neut # 13.45 (H) 2.1 - 9.2 x10(3)/mcL    Mono # 0.90 0.1 - 1.3 x10(3)/mcL    Eos # 0.01 0 - 0.9 x10(3)/mcL    Baso # 0.03 0 - 0.2 x10(3)/mcL    IG# 0.08 (H) 0 - 0.04 x10(3)/mcL    IG% 0.5 %    NRBC% 0.0 %   Hemoglobin A1C    Collection Time: 04/25/23 12:23 PM   Result Value Ref Range    Hemoglobin A1c 5.9 <=7.0 %    Estimated Average Glucose 122.6 mg/dL   Echo    Collection Time: 04/25/23  4:19 PM   Result Value Ref Range    BSA 1.94 m2    TDI SEPTAL 0.08 m/s    LV LATERAL E/E' RATIO 5.80 m/s    LV SEPTAL E/E' RATIO 7.25 m/s    Right Atrial Pressure (from IVC) 8 mmHg    EF 35 %    Left Ventricular Outflow Tract Mean Velocity 0.47 cm/s    Left Ventricular Outflow Tract Mean Gradient 1.00 mmHg    TDI LATERAL 0.10 m/s    PV PEAK VELOCITY 0.75 cm/s    LVIDd 5.20 3.5 - 6.0 cm    IVS 0.82 0.6 - 1.1 cm    Posterior Wall 0.88 0.6 - 1.1 cm    LVIDs 3.95 2.1 - 4.0 cm    FS 24 28 - 44 %    LV mass 156.93 g    LA size 4.10 cm    TAPSE 2.20 cm    Left Ventricle Relative Wall Thickness 0.34 cm    AV mean gradient 2 mmHg    AV valve area 2.09 cm2    AV Velocity Ratio 0.76     AV index (prosthetic) 0.67     MV mean gradient 1 mmHg    MV valve area p 1/2 method 2.68 cm2    MV valve area by continuity eq 3.12 cm2    E/A ratio 1.76     Mean e' 0.09 m/s    E wave deceleration time 137.00 msec    LVOT diameter 2.00 cm    LVOT area  3.1 cm2    LVOT peak xavier 0.73 m/s    LVOT peak VTI 14.60 cm    Ao peak xavier 0.96 m/s    Ao VTI 21.9 cm    LVOT stroke volume 45.84 cm3    AV peak gradient 4 mmHg    MV peak gradient 1 mmHg    TV rest pulmonary artery pressure 48 mmHg    E/E' ratio 6.44 m/s    MV Peak E Xavier 0.58 m/s    TR Max Xavier 3.16 m/s    MV VTI 14.7 cm    MV stenosis pressure 1/2 time 82.00 ms    MV Peak A Xavier 0.33 m/s    LV Systolic Volume 67.90 mL    LV Systolic Volume Index 35.2 mL/m2    LV Diastolic Volume 130.00 mL    LV Diastolic Volume Index 67.36 mL/m2    LV Mass Index 81 g/m2    Triscuspid Valve Regurgitation Peak Gradient 40 mmHg    Hester's Biplane MOD Ejection Fraction 3 %   APTT    Collection Time: 04/26/23  4:38 AM   Result Value Ref Range    PTT 26.0 23.2 - 33.7 seconds   CBC with Differential    Collection Time: 04/26/23  4:38 AM   Result Value Ref Range    WBC 13.9 (H) 4.5 - 11.5 x10(3)/mcL    RBC 3.58 (L) 4.20 - 5.40 x10(6)/mcL    Hgb 12.7 12.0 - 16.0 g/dL    Hct 37.1 37.0 - 47.0 %    .6 (H) 80.0 - 94.0 fL    MCH 35.5 (H) 27.0 - 31.0 pg    MCHC 34.2 33.0 - 36.0 g/dL    RDW 14.6 11.5 - 17.0 %    Platelet 213 130 - 400 x10(3)/mcL    MPV 12.1 (H) 7.4 - 10.4 fL    Neut % 73.5 %    Lymph % 16.7 %    Mono % 9.2 %    Eos % 0.1 %    Basophil % 0.1 %    Lymph # 2.31 0.6 - 4.6 x10(3)/mcL    Neut # 10.19 (H) 2.1 - 9.2 x10(3)/mcL    Mono # 1.28 0.1 - 1.3 x10(3)/mcL    Eos # 0.01 0 - 0.9 x10(3)/mcL    Baso # 0.02 0 - 0.2 x10(3)/mcL    IG# 0.05 (H) 0 - 0.04 x10(3)/mcL    IG% 0.4 %    NRBC% 0.0 %       X-Ray Chest AP Portable   Final Result      No acute chest disease is identified.         Electronically signed by: Ayad Cohen   Date:    04/25/2023   Time:    11:16            Assessment/Plan:  This is a 67 y.o. female known to Dr. Lyle with PMH of tobacco use, HTN, HLD, anxiety. She presented to the ED 04/25/23 with dyspnea and chest pain. EKG revealed ST elevations in inferior leads and STEMI activation was called.  Patient taken to cath lab for emergent LHC that was negative. GI consulted for noncardiac chest pain.    Noncardiac chest pain with nausea  - LHC negative  - PPI BID  - trial of levsin PRN  - NPO  - EGD today  - patient can try GERD friendly diet at home to reduce risk of esophageal spasm. Will provide education    Thank you for allowing us to participate in the care of Maribel Gonsalez.    NATALIA SmileyC  Gastroenterology  [facility]

## 2023-04-26 NOTE — TRANSFER OF CARE
"Anesthesia Transfer of Care Note    Patient: Maribel Gonsalez    Procedure(s) Performed: Procedure(s) (LRB):  EGD (ESOPHAGOGASTRODUODENOSCOPY) (N/A)    Patient location: GI    Anesthesia Type: MAC    Transport from OR: Transported from OR on room air with adequate spontaneous ventilation    Post pain: adequate analgesia    Post assessment: no apparent anesthetic complications    Post vital signs: stable    Level of consciousness: awake    Nausea/Vomiting: no nausea/vomiting    Complications: none    Transfer of care protocol was followed      Last vitals:   Visit Vitals  BP 95/60   Pulse 90   Temp 37 °C (98.6 °F)   Resp 18   Ht 5' 9" (1.753 m)   Wt 77.1 kg (169 lb 15.6 oz)   SpO2 99%   Breastfeeding No   BMI 25.10 kg/m²     "

## 2023-04-26 NOTE — PLAN OF CARE
Received from procedure room:    A-clear airway, able to speak in full sentences, able to expectorate secretions  B-Breathing spontaneously in regular intervals  C-Skin warm to touch, CRT less than 2 seconds    Pain-Denies pain, appears comfortable, no grimaced face noted    Vital signs taken and kept monitored  Seen by clinician before going back to the floor    Back to regular diet.    Able to tolerate liquids in GI lab

## 2023-04-26 NOTE — PLAN OF CARE
Problem: Adult Inpatient Plan of Care  Goal: Plan of Care Review  4/26/2023 0108 by Dali Mooney RN  Outcome: Ongoing, Progressing  4/25/2023 2030 by Dali Mooney RN  Outcome: Ongoing, Progressing  4/25/2023 2030 by Dali Mooney RN  Outcome: Ongoing, Progressing  Goal: Patient-Specific Goal (Individualized)  4/26/2023 0108 by Dali Mooney RN  Outcome: Ongoing, Progressing  4/25/2023 2030 by Dali Mooney RN  Outcome: Ongoing, Progressing  4/25/2023 2030 by Dali Mooney RN  Outcome: Ongoing, Progressing  Goal: Absence of Hospital-Acquired Illness or Injury  4/26/2023 0108 by Dali Mooney RN  Outcome: Ongoing, Progressing  4/25/2023 2030 by Dali Mooney RN  Outcome: Ongoing, Progressing  4/25/2023 2030 by Dali Mooney RN  Outcome: Ongoing, Progressing  Goal: Optimal Comfort and Wellbeing  4/26/2023 0108 by Dali Mooney RN  Outcome: Ongoing, Progressing  4/25/2023 2030 by Dali Mooney RN  Outcome: Ongoing, Progressing  4/25/2023 2030 by Dali Mooney RN  Outcome: Ongoing, Progressing  Goal: Readiness for Transition of Care  4/26/2023 0108 by Dali Mooney RN  Outcome: Ongoing, Progressing  4/25/2023 2030 by Dali Mooney RN  Outcome: Ongoing, Progressing  4/25/2023 2030 by Dali Mooney RN  Outcome: Ongoing, Progressing     Problem: Chest Pain  Goal: Resolution of Chest Pain Symptoms  4/26/2023 0108 by Dali Mooney RN  Outcome: Ongoing, Progressing  4/25/2023 2030 by Dali Mooney RN  Outcome: Ongoing, Progressing     Problem: Arrhythmia/Dysrhythmia (Cardiac Catheterization)  Goal: Stable Heart Rate and Rhythm  4/26/2023 0108 by Dali Mooney RN  Outcome: Ongoing, Progressing  4/25/2023 2030 by Dali Mooney RN  Outcome: Ongoing, Progressing     Problem: Bleeding (Cardiac Catheterization)  Goal: Absence of Bleeding  4/26/2023 0108 by Dali Mooney RN  Outcome: Ongoing, Progressing  4/25/2023 2030 by Dali Mooney RN  Outcome: Ongoing, Progressing     Problem: Contrast-Induced Injury Risk (Cardiac Catheterization)  Goal:  Absence of Contrast-Induced Injury  4/26/2023 0108 by Dali Mooney RN  Outcome: Ongoing, Progressing  4/25/2023 2030 by Dali Mooney RN  Outcome: Ongoing, Progressing     Problem: Embolism (Cardiac Catheterization)  Goal: Absence of Embolism Signs and Symptoms  4/26/2023 0108 by Dali Mooney RN  Outcome: Ongoing, Progressing  4/25/2023 2030 by Dali Mooney RN  Outcome: Ongoing, Progressing     Problem: Ongoing Anesthesia/Sedation Effects (Cardiac Catheterization)  Goal: Anesthesia/Sedation Recovery  4/26/2023 0108 by Dali Mooney RN  Outcome: Ongoing, Progressing  4/25/2023 2030 by Dlai Mooney RN  Outcome: Ongoing, Progressing     Problem: Pain (Cardiac Catheterization)  Goal: Acceptable Pain Control  4/26/2023 0108 by Dali Mooney RN  Outcome: Ongoing, Progressing  4/25/2023 2030 by Dali Mooney RN  Outcome: Ongoing, Progressing     Problem: Vascular Access Protection (Cardiac Catheterization)  Goal: Absence of Vascular Access Complication  4/26/2023 0108 by Dali Mooney RN  Outcome: Ongoing, Progressing  4/25/2023 2030 by Dali Mooney RN  Outcome: Ongoing, Progressing

## 2023-04-27 VITALS
HEART RATE: 59 BPM | OXYGEN SATURATION: 98 % | RESPIRATION RATE: 22 BRPM | SYSTOLIC BLOOD PRESSURE: 109 MMHG | TEMPERATURE: 98 F | DIASTOLIC BLOOD PRESSURE: 63 MMHG | HEIGHT: 69 IN | BODY MASS INDEX: 25.18 KG/M2 | WEIGHT: 170 LBS

## 2023-04-27 PROBLEM — R07.9 CHEST PAIN: Status: ACTIVE | Noted: 2023-04-27

## 2023-04-27 LAB
ANION GAP SERPL CALC-SCNC: 10 MEQ/L
BASOPHILS # BLD AUTO: 0.03 X10(3)/MCL (ref 0–0.2)
BASOPHILS NFR BLD AUTO: 0.3 %
BUN SERPL-MCNC: 20.4 MG/DL (ref 9.8–20.1)
CALCIUM SERPL-MCNC: 9.4 MG/DL (ref 8.4–10.2)
CHLORIDE SERPL-SCNC: 100 MMOL/L (ref 98–107)
CO2 SERPL-SCNC: 26 MMOL/L (ref 23–31)
CREAT SERPL-MCNC: 0.97 MG/DL (ref 0.55–1.02)
CREAT/UREA NIT SERPL: 21
EOSINOPHIL # BLD AUTO: 0.02 X10(3)/MCL (ref 0–0.9)
EOSINOPHIL NFR BLD AUTO: 0.2 %
ERYTHROCYTE [DISTWIDTH] IN BLOOD BY AUTOMATED COUNT: 14.6 % (ref 11.5–17)
GFR SERPLBLD CREATININE-BSD FMLA CKD-EPI: >60 MLS/MIN/1.73/M2
GLUCOSE SERPL-MCNC: 97 MG/DL (ref 82–115)
HCT VFR BLD AUTO: 37.6 % (ref 37–47)
HGB BLD-MCNC: 13.1 G/DL (ref 12–16)
IMM GRANULOCYTES # BLD AUTO: 0.02 X10(3)/MCL (ref 0–0.04)
IMM GRANULOCYTES NFR BLD AUTO: 0.2 %
LYMPHOCYTES # BLD AUTO: 2.51 X10(3)/MCL (ref 0.6–4.6)
LYMPHOCYTES NFR BLD AUTO: 22.9 %
MCH RBC QN AUTO: 35 PG (ref 27–31)
MCHC RBC AUTO-ENTMCNC: 34.8 G/DL (ref 33–36)
MCV RBC AUTO: 100.5 FL (ref 80–94)
MONOCYTES # BLD AUTO: 0.92 X10(3)/MCL (ref 0.1–1.3)
MONOCYTES NFR BLD AUTO: 8.4 %
NEUTROPHILS # BLD AUTO: 7.46 X10(3)/MCL (ref 2.1–9.2)
NEUTROPHILS NFR BLD AUTO: 68 %
NRBC BLD AUTO-RTO: 0 %
PLATELET # BLD AUTO: 213 X10(3)/MCL (ref 130–400)
PMV BLD AUTO: 12.3 FL (ref 7.4–10.4)
POCT GLUCOSE: 92 MG/DL (ref 70–110)
POTASSIUM SERPL-SCNC: 3.1 MMOL/L (ref 3.5–5.1)
RBC # BLD AUTO: 3.74 X10(6)/MCL (ref 4.2–5.4)
SODIUM SERPL-SCNC: 136 MMOL/L (ref 136–145)
WBC # SPEC AUTO: 11 X10(3)/MCL (ref 4.5–11.5)

## 2023-04-27 PROCEDURE — 80048 BASIC METABOLIC PNL TOTAL CA: CPT | Performed by: NURSE PRACTITIONER

## 2023-04-27 PROCEDURE — 25000003 PHARM REV CODE 250

## 2023-04-27 PROCEDURE — 25000003 PHARM REV CODE 250: Performed by: NURSE PRACTITIONER

## 2023-04-27 PROCEDURE — 85025 COMPLETE CBC W/AUTO DIFF WBC: CPT | Performed by: NURSE PRACTITIONER

## 2023-04-27 RX ORDER — ATORVASTATIN CALCIUM 80 MG/1
80 TABLET, FILM COATED ORAL DAILY
Qty: 90 TABLET | Refills: 3 | OUTPATIENT
Start: 2023-04-27 | End: 2023-04-27 | Stop reason: SDUPTHER

## 2023-04-27 RX ORDER — METOPROLOL SUCCINATE 25 MG/1
12.5 TABLET, EXTENDED RELEASE ORAL DAILY
Qty: 15 TABLET | Refills: 11 | OUTPATIENT
Start: 2023-04-27 | End: 2023-04-27 | Stop reason: SDUPTHER

## 2023-04-27 RX ORDER — PANTOPRAZOLE SODIUM 40 MG/1
40 TABLET, DELAYED RELEASE ORAL
Qty: 60 TABLET | Refills: 3 | OUTPATIENT
Start: 2023-04-27 | End: 2023-04-27 | Stop reason: SDUPTHER

## 2023-04-27 RX ORDER — PANTOPRAZOLE SODIUM 40 MG/1
40 TABLET, DELAYED RELEASE ORAL
Qty: 60 TABLET | Refills: 3 | Status: SHIPPED | OUTPATIENT
Start: 2023-04-27 | End: 2024-04-26

## 2023-04-27 RX ORDER — METOPROLOL SUCCINATE 25 MG/1
12.5 TABLET, EXTENDED RELEASE ORAL DAILY
Qty: 15 TABLET | Refills: 11 | Status: SHIPPED | OUTPATIENT
Start: 2023-04-27 | End: 2024-04-26

## 2023-04-27 RX ORDER — ASPIRIN 81 MG/1
81 TABLET ORAL DAILY
Qty: 30 TABLET | Refills: 0 | OUTPATIENT
Start: 2023-04-27 | End: 2024-04-26

## 2023-04-27 RX ORDER — ASPIRIN 81 MG/1
81 TABLET ORAL DAILY
Qty: 30 TABLET | Refills: 0 | OUTPATIENT
Start: 2023-04-27 | End: 2023-04-27 | Stop reason: SDUPTHER

## 2023-04-27 RX ORDER — POTASSIUM CHLORIDE 20 MEQ/1
40 TABLET, EXTENDED RELEASE ORAL ONCE
Status: COMPLETED | OUTPATIENT
Start: 2023-04-27 | End: 2023-04-27

## 2023-04-27 RX ORDER — ATORVASTATIN CALCIUM 80 MG/1
80 TABLET, FILM COATED ORAL DAILY
Qty: 90 TABLET | Refills: 3 | Status: SHIPPED | OUTPATIENT
Start: 2023-04-27 | End: 2024-04-26

## 2023-04-27 RX ADMIN — ATORVASTATIN CALCIUM 80 MG: 40 TABLET, FILM COATED ORAL at 09:04

## 2023-04-27 RX ADMIN — PANTOPRAZOLE SODIUM 40 MG: 40 TABLET, DELAYED RELEASE ORAL at 05:04

## 2023-04-27 RX ADMIN — METOPROLOL SUCCINATE 12.5 MG: 25 TABLET, EXTENDED RELEASE ORAL at 09:04

## 2023-04-27 RX ADMIN — ASPIRIN 81 MG: 81 TABLET, COATED ORAL at 09:04

## 2023-04-27 RX ADMIN — POTASSIUM CHLORIDE 40 MEQ: 1500 TABLET, EXTENDED RELEASE ORAL at 09:04

## 2023-04-27 NOTE — PLAN OF CARE
04/27/23 1008   Final Note   Assessment Type Final Discharge Note   Anticipated Discharge Disposition Home  (Patient will D/C home today after fitting for [employer]. Nick ([facility]) will be here between 10:30-11:00 AM for fitting prior to D/C home.)   Hospital Resources/Appts/Education Provided Post-Acute resouces added to AVS   Post-Acute Status   Discharge Delays None known at this time

## 2023-04-27 NOTE — PLAN OF CARE
Problem: Adult Inpatient Plan of Care  Goal: Plan of Care Review  Outcome: Ongoing, Progressing  Goal: Patient-Specific Goal (Individualized)  Outcome: Ongoing, Progressing  Goal: Absence of Hospital-Acquired Illness or Injury  Outcome: Ongoing, Progressing  Goal: Optimal Comfort and Wellbeing  Outcome: Ongoing, Progressing  Goal: Readiness for Transition of Care  Outcome: Ongoing, Progressing     Problem: Arrhythmia/Dysrhythmia (Cardiac Catheterization)  Goal: Stable Heart Rate and Rhythm  Outcome: Ongoing, Progressing     Problem: Bleeding (Cardiac Catheterization)  Goal: Absence of Bleeding  Outcome: Ongoing, Progressing     Problem: Contrast-Induced Injury Risk (Cardiac Catheterization)  Goal: Absence of Contrast-Induced Injury  Outcome: Ongoing, Progressing     Problem: Ongoing Anesthesia/Sedation Effects (Cardiac Catheterization)  Goal: Anesthesia/Sedation Recovery  Outcome: Ongoing, Progressing     Problem: Pain (Cardiac Catheterization)  Goal: Acceptable Pain Control  Outcome: Ongoing, Progressing

## 2023-04-27 NOTE — ANESTHESIA POSTPROCEDURE EVALUATION
Anesthesia Post Evaluation    Patient: Maribel Gonsalez    Procedure(s) Performed: Procedure(s) (LRB):  EGD (ESOPHAGOGASTRODUODENOSCOPY) (N/A)    Final Anesthesia Type: general      Patient location during evaluation: PACU  Patient participation: Yes- Able to Participate  Level of consciousness: awake and alert  Post-procedure vital signs: reviewed and stable  Pain management: adequate  Airway patency: patent      Anesthetic complications: no      Cardiovascular status: blood pressure returned to baseline  Respiratory status: unassisted  Hydration status: euvolemic  Follow-up not needed.          Vitals Value Taken Time   /63 04/27/23 0802   Temp 36.6 °C (97.9 °F) 04/27/23 0802   Pulse 59 04/27/23 0802   Resp 22 04/27/23 0802   SpO2 98 % 04/27/23 0802         No case tracking events are documented in the log.      Pain/Lupe Score: Lupe Score: 10 (4/26/2023  3:41 PM)

## 2023-04-27 NOTE — DISCHARGE SUMMARY
[facility] - [facility] Tele[facility]  Cardiology  Discharge Summary      Patient Name: Maribel Gonsalez  MRN: [id number]  Admission Date: 4/25/2023  Hospital Length of Stay: 2 days  Discharge Date and Time:  04/27/2023 7:07 AM  Attending Physician: Yoshi Palacios MD    Discharging Provider: COY Dorman  Primary Care Physician: Abundio Celestin MD    HPI:   Ms. Gonsalez is a 68 y/o female, unknown to [facility], who presented to ED with c/o midsternal chest tightness radiating to back and left arm with associated SOB and nausea starting at 0830. Denies vomiting or diaphoresis. EKG revealed ST elevations in inferior leads. She will be admitted to [facility] for STEMI     Hospital Course:  04.26.23: Patient s/p LHC revealing nonobstructive CAD. EF down to 35% with LV diastolic dysfunction, Segmental WMA, and mild to moderate VHD. Denies CP today. She did have episodes of nausea yesterday therefore GI was consulted to evaluate for noncardiac causes of chest pain   04.27.23: S/p EGD yesterday revealing areas of erythema in antrum and duodenum.Areas biopsied. She will be on protonix bid x 3 months. She had an episode of hypotension overnight after receiving lasix. BP stable currently    C 04.25.23:  Left main:  Widely patent.  Bifurcates into circumflex and LAD.  Left anterior descending:  Moderate caliber vessel with a type 3.  Midvessel 30-40% lesion.  Circumflex:  Moderate caliber vessel with proximal 30-40% lesion.  RCA:  Large caliber dominant vessel with luminal  LVEDP:  20-25 mmHg  Aortic valve:  No stenosis     Impression:  No acute occlusion noted.  Nonobstructive coronary artery disease   Elevated left ventricular end-diastolic pressure  No aortic stenosis     TTE 04.25.23:  The left ventricle is normal in size with decreased systolic function. LVEF 35%.  There are segmental left ventricular wall motion abnormalities: basal hypercontractility with apical hypokinesis. DDx: LAD ischemia versus Takotsubo  cardiomyopathy  Left ventricular diastolic dysfunction.  Normal right ventricular size with normal right ventricular systolic function.  Mild mitral regurgitation.  Mild to moderate tricuspid regurgitation.  The estimated PA systolic pressure is 48 mmHg. There is pulmonary hypertension.    Procedure(s) (LRB):  EGD (ESOPHAGOGASTRODUODENOSCOPY) (N/A)                            patient tolerated the procedure well.   Findings:        LA Grade A (one or more mucosal breaks less than 5 mm, not extending        between tops of 2 mucosal folds) esophagitis with no bleeding was        found at the gastroesophageal junction.        The exam of the esophagus was otherwise normal.        Diffuse moderately erythematous mucosa without bleeding was found in        the gastric antrum. Biopsies were taken with a cold forceps for        Helicobacter pylori testing.        The cardia and gastric fundus were normal on retroflexion.        Patchy mildly erythematous mucosa without active bleeding was found        in the duodenal bulb.        The exam of the duodenum was otherwise normal.   Impression:            - LA Grade A reflux esophagitis with no bleeding.                          - Erythematous mucosa in the antrum. Biopsied.                          - Erythematous duodenopathy.   Recommendation:        - [facility] to [facility] for possible                          discharge same day.                          - Resume previous diet.                          - Continue present medications.                          - Await pathology results.                          - Use Protonix (pantoprazole) 40 mg PO daily for 3                          months.                          - Return to GI clinic in 1 month.       Physical Exam   Cardiovascular: Normal rate, regular rhythm, normal heart sounds and normal pulses. Pulmonary:      Effort: Pulmonary effort is normal.      Breath sounds: Normal breath sounds.     Abdominal:  Soft.   Musculoskeletal:         General: Normal range of motion.   Neurological: She is alert.   Skin: Skin is warm. Capillary refill takes less than 2 seconds.   Right radial site benign   Psychiatric: Mood normal.      Significant Diagnostic Studies: Labs:   BMP:   Recent Labs   Lab 04/25/23  1002 04/27/23  0439    136   K 3.8 3.1*   CO2 20* 26   BUN 13.4 20.4*   CREATININE 1.07* 0.97   CALCIUM 9.8 9.4   , CBC   Recent Labs   Lab 04/25/23  1223 04/26/23  0438 04/27/23  0723   WBC 15.6* 13.9* 11.0   HGB 13.3 12.7 13.1   HCT 38.0 37.1 37.6    213 213   , Lipid Panel   Lab Results   Component Value Date    CHOL 203 (H) 04/25/2023    HDL 62 (H) 04/25/2023    TRIG 148 (H) 04/25/2023    and Troponin   Recent Labs   Lab 04/25/23  1002   TROPONINI <0.010       Pending Diagnostic Studies:       Procedure Component Value Units Date/Time    Specimen to Pathology [[id number]] Collected: 04/26/23 1509    Order Status: Sent Lab Status: No result     Specimen: Tissue from Stomach     Urinalysis, Reflex to Urine Culture [[id number]]     Order Status: Sent Lab Status: No result     Specimen: Urine           STEMI, inferior  Native CAD  --[facility] 04.25.23: LAD 30-40%, Lcx 30-40%  NICMO  --EF 35% with diastolic dysfunction  VHD  --mild to moderate TR  --mild MR  HTN  Nausea  --s/p EGD revealed erythematous areas to antrum of stomach and duodenum  Tobacco dependence  Leukocytosis  --no fevers  No hx GIB     Plan:  BP marginal. Decrease Metoprolol succinate to 12.5mg daily. BP will not tolerate addition of ACEi/ARB, or ARNI. Will optimize GDMT as outpatient.   Continue aspirin 81mg daily and atorvastatin 80 mg daily  Continue Protonix 40mg bid x 3 months per GI request. Will F.u with GI as outpatient.  Will need lifevest upon DC due to STEMI and EF 35%    Discharged Condition: stable    Disposition: Home or Self Care    Follow Up:   Follow-up Information       Luther Bonds Jr, MD Follow up.    Specialty: Cardiology  Why: 1  week post discharge  Contact information:  [address]  [place] [address]  [phone]                           Patient Instructions:      Diet Cardiac     No driving until:   Order Comments: May resume driving in 3 days     Lifting restrictions   Order Comments: No lifting > 10#s x 1 week     Notify your health care provider if you experience any of the following:  redness, tenderness, or signs of infection (pain, swelling, redness, odor or green/yellow discharge around incision site)     Notify your health care provider if you experience any of the following:  severe uncontrolled pain     Notify your health care provider if you experience any of the following:  difficulty breathing or increased cough     No dressing needed   Order Comments: Wash site daily with soap and water     Shower on day dressing removed (No bath)   Order Comments: Showers only x 1 week     Medications:  Reconciled Home Medications:      Medication List        START taking these medications      aspirin 81 MG EC tablet  Commonly known as: ECOTRIN  Take 1 tablet (81 mg total) by mouth once daily.     atorvastatin 80 MG tablet  Commonly known as: LIPITOR  Take 1 tablet (80 mg total) by mouth once daily.     metoprolol succinate 25 MG 24 hr tablet  Commonly known as: TOPROL-XL  Take 0.5 tablets (12.5 mg total) by mouth once daily.     pantoprazole 40 MG tablet  Commonly known as: PROTONIX  Take 1 tablet (40 mg total) by mouth 2 (two) times daily before meals.            CONTINUE taking these medications      FLUoxetine 20 MG capsule  Take 20 mg by mouth once daily.            STOP taking these medications      lisinopriL-hydrochlorothiazide 20-12.5 mg per tablet  Commonly known as: PRINZIDE,ZESTORETIC     meloxicam 15 MG tablet  Commonly known as: MOBIC              Time spent on the discharge of patient: 35 minutes    COY Dorman  Cardiology  [facility] - [facility] Tele[facility]

## 2023-04-28 ENCOUNTER — PATIENT OUTREACH (OUTPATIENT)
Dept: ADMINISTRATIVE | Facility: CLINIC | Age: 68
End: 2023-04-28
Payer: MEDICARE

## 2023-05-01 NOTE — PROGRESS NOTES
2nd attempt made to reach patient for [facility] call. Left voicemail please call [phone] leave first name, last, and date of birth for Myesha. I will return your call.

## 2023-05-18 LAB — PSYCHE PATHOLOGY RESULT: NORMAL

## 2023-06-01 NOTE — ADDENDUM NOTE
Addendum  created 06/01/23 0921 by Sundar Garay MD    Attestation recorded in Intraprocedure, Intraprocedure Attestations filed, Intraprocedure Event edited

## 2023-09-07 ENCOUNTER — PATIENT MESSAGE (OUTPATIENT)
Dept: RESEARCH | Facility: HOSPITAL | Age: 68
End: 2023-09-07
Payer: MEDICARE

## 2024-05-18 NOTE — ED PROVIDER NOTES
Encounter Date: 4/25/2023    SCRIBE #1 NOTE: I, Cathyfabiola Delgado, am scribing for, and in the presence of,  Antony Wallace MD. the EKG reading. Other sections scribed: HPI, ROS, PE.     History     Chief Complaint   Patient presents with    Chest Pain     Chest pain since 0830; denies cardiac hx; hand cramping while taking BP on left arm; unable to get temp in triage     67 year old female with a history of anxiety, HTN, and high cholesterol presents to ED complaining of shortness of breath and chest pain.  Pt reports that chest pain, which she describes as tightness, that radiates to her jaw, and shortness of breath started around 0830.  She says it felt like an anxiety attack but it did not stop.  Pt says that she does not have a cardiologist.    The history is provided by the patient. No  was used.   Chest Pain  The current episode started 1 to 2 hours ago. Chest pain occurs constantly. The chest pain is unchanged. The quality of the pain is described as tightness. The pain radiates to the left jaw. Chest pain is worsened by exertion. Primary symptoms include shortness of breath. She tried nothing for the symptoms.   Procedure history is negative for cardiac catheterization, echocardiogram, stress echo, stress thallium, exercise treadmill test and coronary CTA.         Review of patient's allergies indicates:  No Known Allergies  History reviewed. No pertinent past medical history.  Past Surgical History:   Procedure Laterality Date    ESOPHAGOGASTRODUODENOSCOPY N/A 4/26/2023    Procedure: EGD (ESOPHAGOGASTRODUODENOSCOPY);  Surgeon: Ernesto Ferguson MD;  Location: [facility] ENDOSCOPY;  Service: Gastroenterology;  Laterality: N/A;    LEFT HEART CATHETERIZATION N/A 4/25/2023    Procedure: Left heart cath;  Surgeon: Yoshi Palacios MD;  Location: [facility] CATH LAB;  Service: Cardiology;  Laterality: N/A;     History reviewed. No pertinent family history.     Review of Systems   Constitutional:  Negative.    HENT: Negative.     Eyes: Negative.    Respiratory:  Positive for shortness of breath.    Cardiovascular:  Positive for chest pain.   Gastrointestinal: Negative.    Endocrine: Negative.    Genitourinary: Negative.    Musculoskeletal: Negative.    Skin: Negative.    Allergic/Immunologic: Negative.    Neurological: Negative.    Hematological: Negative.    Psychiatric/Behavioral: Negative.     All other systems reviewed and are negative.    Physical Exam     Initial Vitals   BP Pulse Resp Temp SpO2   04/25/23 0945 04/25/23 0945 04/25/23 0945 04/25/23 1031 04/25/23 0945   122/76 (!) 59 18 97.2 °F (36.2 °C) 100 %      MAP       --                Physical Exam    Nursing note and vitals reviewed.  Constitutional: She appears well-developed and well-nourished. She does not appear ill. No distress.   HENT:   Head: Normocephalic and atraumatic.   Mouth/Throat: Oropharynx is clear and moist.   Eyes: Conjunctivae and EOM are normal. Pupils are equal, round, and reactive to light.   Neck: Neck supple.   Normal range of motion.  Cardiovascular:  Regular rhythm, normal heart sounds and intact distal pulses.   Bradycardia present.         No murmur heard.  Pulmonary/Chest: Breath sounds normal. No respiratory distress. She has no wheezes. She has no rales. She exhibits no tenderness.   Abdominal: Abdomen is soft. Bowel sounds are normal. She exhibits no distension. There is no abdominal tenderness.   No right CVA tenderness.  No left CVA tenderness. There is no rebound.   Musculoskeletal:         General: No tenderness or edema. Normal range of motion.      Cervical back: Normal range of motion and neck supple.      Lumbar back: Normal. Normal range of motion.     Neurological: She is alert and oriented to person, place, and time. She has normal strength. No cranial nerve deficit or sensory deficit. GCS score is 15. GCS eye subscore is 4. GCS verbal subscore is 5. GCS motor subscore is 6.   Skin: Skin is warm and dry.  Capillary refill takes less than 2 seconds. No rash noted. No erythema.   Psychiatric: She has a normal mood and affect. Her mood appears not anxious.       ED Course   Critical Care    Date/Time: 4/25/2023 10:30 AM  Performed by: Antony Wallace MD  Authorized by: Antony Wallace MD   Direct patient critical care time: 12 minutes  Additional history critical care time: 8 minutes  Ordering / reviewing critical care time: 6 minutes  Documentation critical care time: 5 minutes  Consulting other physicians critical care time: 6 minutes  Total critical care time (exclusive of procedural time) : 37 minutes  Critical care time was exclusive of separately billable procedures and treating other patients and teaching time.  Critical care was necessary to treat or prevent imminent or life-threatening deterioration of the following conditions: cardiac failure and circulatory failure.  Critical care was time spent personally by me on the following activities: development of treatment plan with patient or surrogate, discussions with consultants, interpretation of cardiac output measurements, evaluation of patient's response to treatment, examination of patient, obtaining history from patient or surrogate, ordering and performing treatments and interventions, ordering and review of laboratory studies, ordering and review of radiographic studies, pulse oximetry, re-evaluation of patient's condition and review of old charts.      Labs Reviewed     EKG Readings: (Independently Interpreted)   Rhythm: Sinus Bradycardia. Heart Rate: 55. Other Impression: Low voltage QRS questionable 0.5mm elevation in leads 2, 3, and AVF.  Repeat EKG ordered.   Time: 0948   Other EKG Interpretations: Time: 1003  Sinus bradycardia, rate of 55, low voltage QRS, 1 mm elevation in lead 2, 0.5 mm of elevation in lead 3 and AVF.  Activated as a STEMI.   ECG Results              EKG 12-lead (Final result)  Result time 04/25/23 12:41:29      Final result by  Griffin, [facility] (04/25/23 12:41:29)                   Narrative:    Test Reason : R07.9,    Vent. Rate : 055 BPM     Atrial Rate : 055 BPM     P-R Int : 178 ms          QRS Dur : 090 ms      QT Int : 484 ms       P-R-T Axes : 073 077 069 degrees     QTc Int : 463 ms    Sinus bradycardia  Low voltage QRS  Possible Anterolateral infarct (cited on or before 25-APR-2023)  Abnormal ECG  When compared with ECG of 25-APR-2023 09:48,  No significant change was found  Confirmed by Cash Navarro MD ([id number]) on 4/25/2023 12:41:23 PM    Referred By:             Confirmed By:Cash Navarro MD                                     EKG 12-lead (Final result)  Result time 04/25/23 12:41:11      Final result by Griffin [facility] (04/25/23 12:41:11)                   Narrative:    Test Reason : R07.9,    Vent. Rate : 055 BPM     Atrial Rate : 055 BPM     P-R Int : 170 ms          QRS Dur : 090 ms      QT Int : 488 ms       P-R-T Axes : 077 085 068 degrees     QTc Int : 466 ms    Sinus bradycardia  Low voltage QRS  Possible Anterolateral infarct ,age undetermined  Abnormal ECG  No previous ECGs available  Confirmed by Cash Navarro MD ([id number]) on 4/25/2023 12:41:01 PM    Referred By:             Confirmed By:Cash Navarro MD                                  Imaging Results              X-Ray Chest AP Portable (Final result)  Result time 04/25/23 11:16:22      Final result by Ayad Cohen MD (04/25/23 11:16:22)                   Impression:      No acute chest disease is identified.      Electronically signed by: Ayad Cohen  Date:    04/25/2023  Time:    11:16               Narrative:    EXAMINATION:  XR CHEST AP PORTABLE    CLINICAL HISTORY:  , Chest pain, unspecified.    FINDINGS:  No alveolar consolidation, effusion, or pneumothorax is seen.   The thoracic aorta is normal  cardiac silhouette, central pulmonary vessels and mediastinum are normal in size and are grossly unremarkable.   visualized osseous structures  are grossly unremarkable.                                    X-Rays:   Independently Interpreted Readings:   Chest X-Ray: Normal heart size.  No infiltrates.  No acute abnormalities.   Medications   heparin (porcine) 5,000 unit/mL injection (has no administration in time range)   propofol (DIPRIVAN) 10 mg/mL IVP injection (has no administration in time range)   glycopyrrolate (ROBINUL) 0.2 mg/mL injection (has no administration in time range)   aspirin EC tablet 325 mg (325 mg Oral Given 4/25/23 1006)   heparin 25,000 units in dextrose 5% (100 units/ml) IV bolus from bag INITIAL BOLUS (max bolus 4000 units) (4,000 Units Intravenous Bolus from Bag 4/25/23 1033)   morphine injection 4 mg (4 mg Intravenous Given 4/25/23 1030)   ondansetron injection 4 mg (4 mg Intravenous Given 4/25/23 1030)   aluminum-magnesium hydroxide-simethicone 200-200-20 mg/5 mL suspension 30 mL (30 mLs Oral Given 4/25/23 1729)     And   LIDOcaine HCl 2% oral solution 15 mL (15 mLs Oral Given 4/25/23 1729)   furosemide injection 20 mg (20 mg Intravenous Given 4/26/23 1634)   LIDOcaine (PF) 20 mg/mL (2%) 20 mg/mL (2 %) injection (  Override pull for Anesthesia 4/26/23 1507)   propofol (DIPRIVAN) 10 mg/mL IVP injection (  Override pull for Anesthesia 4/26/23 1507)   LIDOcaine (PF) 20 mg/mL (2%) 20 mg/mL (2 %) injection (  Override pull for Anesthesia 4/26/23 1506)   potassium chloride SA CR tablet 40 mEq (40 mEq Oral Given 4/27/23 0925)     Medical Decision Making:   History:   I obtained history from: someone other than patient.       <> Summary of History: Family at bedside reports CP that began this morning  Old Medical Records: I decided to obtain old medical records.  Old Records Summarized: records from clinic visits and records from previous admission(s).       <> Summary of Records: Reviewed old records, hx of HTN, HLD  Initial Assessment:   Chest pain  Differential Diagnosis:   Judging by the patient's chief complaint and pertinent  history, the patient has the following possible differential diagnoses, including but not limited to the following.  Some of these are deemed to be lower likelihood and some more likely based on my physical exam and history combined with possible lab work and/or imaging studies.   Please see the pertinent studies, and refer to the HPI.  Some of these diagnoses will take further evaluation to fully rule out, perhaps as an outpatient and the patient was encouraged to follow up when discharged for more comprehensive evaluation.    Chest pain emergent diagnoses: ACS, PE, dissection, cardiac tamponade, tension pneumothorax.    Chest pain non-emergent diagnoses: Musculoskeletal, trauma, pleurisy, pneumonia, pleural effusion, GERD, other GI, neurologic, psychiatric and other non emergent diagnoses considered.        Independently Interpreted Test(s):   I have ordered and independently interpreted X-rays - see prior notes.  I have ordered and independently interpreted EKG Reading(s) - see prior notes  Clinical Tests:   Lab Tests: Ordered and Reviewed  Radiological Study: Ordered and Reviewed  Medical Tests: Ordered and Reviewed  ED Management:  Patient is a 67-year-old female who presents to the emergency department complaining of chest pain, substernal that radiates to her left arm and left jaw associated with shortness of breath.  She has a history of high blood pressure, hyperlipidemia.  Has not been evaluated previously by Cardiology.  No previous EKG available for comparison.  Her EKG showed half a mm of elevation in 2 3 AVF.  Repeat EKG showed 1 mm in lead 2 and 0.5 mm in 3 and AVF.  Discussed with cardiology, Dr. Bonds.  Activated as a STEMI alert.  Has received heparin.  All results discussed with patient.  Answered all questions at this time.  Patient will be taken to cath lab and subsequently admitted to Hospital.  Verbalized understanding agreed to plan.  Other:   I have discussed this case with another health  care provider.       <> Summary of the Discussion: Discussed with Cardiology, Dr. Bonds.        Scribe Attestation:   Scribe #1: I performed the above scribed service and the documentation accurately describes the services I performed. I attest to the accuracy of the note.    Attending Attestation:           Physician Attestation for Scribe:  Physician Attestation Statement for Scribe #1: I, Antony Wallace MD, reviewed documentation, as scribed by Cathy Delgado in my presence, and it is both accurate and complete.       Medical Decision Making  Problems Addressed:  Chest pain: acute illness or injury that poses a threat to life or bodily functions  ST elevation myocardial infarction (STEMI), unspecified artery: acute illness or injury that poses a threat to life or bodily functions    Amount and/or Complexity of Data Reviewed  Independent Historian: parent  External Data Reviewed: labs and radiology.  Labs: ordered.  Radiology: ordered and independent interpretation performed.  ECG/medicine tests: ordered and independent interpretation performed.    Risk  Parenteral controlled substances.  Decision regarding hospitalization.  Elective major surgery with identified risk factors.                        Clinical Impression:   Final diagnoses:  [R07.9] Chest pain (Primary)  [I21.3] ST elevation myocardial infarction (STEMI), unspecified artery        ED Disposition Condition    Admit Stable                Antony Wallace MD  04/28/23 [id number]     DC instructions

## (undated) DEVICE — SOL IRRI STRL WATER 1000ML

## (undated) DEVICE — Device

## (undated) DEVICE — TIP SUCTION YANKAUER

## (undated) DEVICE — GUIDEWIRE INQWIRE SE 3MM JTIP

## (undated) DEVICE — COLLECTION SPECIMEN NEPTUNE

## (undated) DEVICE — DEVICE BASIXCOMPAK INFL 20ML

## (undated) DEVICE — INTRODUCER SHEATH 6FR 11CM

## (undated) DEVICE — BAND TR COMP DEVICE REG 24CM

## (undated) DEVICE — CATH OPTITORQUE TIG 4.5 100 CM

## (undated) DEVICE — CANNULA ADULT NASAL 7FT

## (undated) DEVICE — PAD DEFIB RADIOLUCENT ADULT

## (undated) DEVICE — SET ANGIO ACIST CVI ANGIOTOUCH

## (undated) DEVICE — TUBING O2 FEMALE CONN 13FT

## (undated) DEVICE — KIT CANIST SUCTION 1200CC

## (undated) DEVICE — VALVE CONTROL COPILOT

## (undated) DEVICE — KIT SURGICAL COLON .25 1.1OZ